# Patient Record
Sex: MALE | Race: WHITE | NOT HISPANIC OR LATINO | Employment: STUDENT | URBAN - METROPOLITAN AREA
[De-identification: names, ages, dates, MRNs, and addresses within clinical notes are randomized per-mention and may not be internally consistent; named-entity substitution may affect disease eponyms.]

---
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
## 2019-05-27 ENCOUNTER — HOSPITAL ENCOUNTER (EMERGENCY)
Facility: CLINIC | Age: 19
Discharge: HOME | End: 2019-05-27
Attending: NURSE PRACTITIONER | Admitting: NURSE PRACTITIONER
Payer: COMMERCIAL

## 2019-05-27 VITALS
WEIGHT: 160 LBS | DIASTOLIC BLOOD PRESSURE: 84 MMHG | RESPIRATION RATE: 14 BRPM | OXYGEN SATURATION: 98 % | SYSTOLIC BLOOD PRESSURE: 126 MMHG | HEART RATE: 65 BPM | BODY MASS INDEX: 24.33 KG/M2 | TEMPERATURE: 97.5 F

## 2019-05-27 DIAGNOSIS — K61.1 PERIRECTAL ABSCESS: Primary | ICD-10-CM

## 2019-05-27 PROCEDURE — 46040 I&D ISCHIORCT&/PERIRCT ABSC: CPT | Mod: Z6 | Performed by: NURSE PRACTITIONER

## 2019-05-27 PROCEDURE — 76857 US EXAM PELVIC LIMITED: CPT | Performed by: NURSE PRACTITIONER

## 2019-05-27 PROCEDURE — 76857 US EXAM PELVIC LIMITED: CPT | Mod: 26 | Performed by: NURSE PRACTITIONER

## 2019-05-27 PROCEDURE — 46040 I&D ISCHIORCT&/PERIRCT ABSC: CPT | Performed by: NURSE PRACTITIONER

## 2019-05-27 PROCEDURE — 99284 EMERGENCY DEPT VISIT MOD MDM: CPT | Mod: 25 | Performed by: NURSE PRACTITIONER

## 2019-05-27 PROCEDURE — 25000125 ZZHC RX 250: Performed by: NURSE PRACTITIONER

## 2019-05-27 RX ORDER — LIDOCAINE HYDROCHLORIDE 20 MG/ML
JELLY TOPICAL ONCE
Status: DISCONTINUED | OUTPATIENT
Start: 2019-05-27 | End: 2019-05-27 | Stop reason: HOSPADM

## 2019-05-27 RX ORDER — OXYCODONE AND ACETAMINOPHEN 5; 325 MG/1; MG/1
1-2 TABLET ORAL EVERY 4 HOURS PRN
Qty: 12 TABLET | Refills: 0 | Status: SHIPPED | OUTPATIENT
Start: 2019-05-27 | End: 2019-06-19

## 2019-05-27 RX ADMIN — Medication: at 10:45

## 2019-05-27 ASSESSMENT — ENCOUNTER SYMPTOMS
VOMITING: 0
EYE PAIN: 0
FEVER: 1
SHORTNESS OF BREATH: 0
HEADACHES: 0
WHEEZING: 0
CONSTIPATION: 0
CONFUSION: 0
SPEECH DIFFICULTY: 0
BLOOD IN STOOL: 1
DYSURIA: 0
RECTAL PAIN: 1
WOUND: 1
SORE THROAT: 0
DIFFICULTY URINATING: 0
SINUS PAIN: 0
DIARRHEA: 0
NAUSEA: 0
ABDOMINAL PAIN: 0
HEMATURIA: 0
PALPITATIONS: 0
COUGH: 0
DIAPHORESIS: 1
ANAL BLEEDING: 1
CHILLS: 1

## 2019-05-27 NOTE — ED NOTES
Pt here with fredo-rectal abscess. He had one last year that required draining twice. Pt states that he has been having the pain on and off since it was last drained. Started to feel nauseated from the pain yesterday.

## 2019-05-27 NOTE — LETTER
May 27, 2019      To Whom It May Concern:      Jose Maria Rai was seen in our Emergency Department today, 05/27/19.  I expect his condition to improve over the next 4 days.  He may return to work on 05/31/2019.    Sincerely,        ERIKA Jalloh CNP

## 2019-05-27 NOTE — ED PROVIDER NOTES
History     Chief Complaint   Patient presents with     Wound Infection     recurrent fredo anal abcess     HPI  Jose Maria Rai is a 19 year old male who presents with concerns of fredo-rectal abscess.   Pt has reported intermittent rectal pain since July of 2018.  Pt states he has daily rectal bleeding with mixture of pustular drainage since July of 2018.  Pt reports yesterday it became swollen and puffy and he felt chills and sweats as well.  PT has not tried any therapies and denies fevers.  Admits to history of perirectal abscess twice last year and subsequent visit with a surgeon who advised him that this should heal without any surgical treatment.        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 Provider to review          Past Medical History:    Past Medical History:   Diagnosis Date     Congenital stenosis of pulmonary valve 1999       Past Surgical History:    Past Surgical History:   Procedure Laterality Date     SURGICAL HISTORY OF -   07/24/00    BMT     SURGICAL HISTORY OF -   06/02/99    Heart Surgery       Family History:    Family History   Problem Relation Age of Onset     Cancer Paternal Grandmother        Social History:  Marital Status:  Single [1]  Social History     Tobacco Use     Smoking status: Never Smoker     Smokeless tobacco: Never Used   Substance Use Topics     Alcohol use: No     Drug use: No        Medications:      amoxicillin-clavulanate (AUGMENTIN) 875-125 MG tablet   oxyCODONE-acetaminophen (PERCOCET) 5-325 MG tablet     Review of Systems   Constitutional:  Positive for chills, diaphoresis and fever (subjective).   HENT: Negative for ear pain, sinus pain and sore throat.    Eyes: Negative for pain and visual disturbance.   Respiratory: Negative for cough, shortness of breath and wheezing.    Cardiovascular: Negative for chest pain, palpitations and leg swelling.   Gastrointestinal: Positive for anal bleeding, blood in stool and rectal pain. Negative for abdominal pain, constipation, diarrhea, nausea and vomiting.   Genitourinary: Negative for difficulty urinating, dysuria and hematuria.   Skin: Positive for wound (fredo rectal). Negative for rash.   Neurological: Negative for speech difficulty and headaches.   Psychiatric/Behavioral: Negative for confusion and self-injury.   All other systems reviewed and are negative.      Physical Exam   BP: 126/84  Pulse: 65  Temp: 97.5  F (36.4  C)  Resp: 14  Weight: 72.6 kg (160 lb)  SpO2: 98 %      Physical Exam   Constitutional: He appears well-developed and well-nourished. No distress.   HENT:   Head: Atraumatic. Head is without contusion.   Right Ear: External ear normal.   Left Ear: External ear normal.   Nose: Nose normal. No rhinorrhea.   Mouth/Throat: Uvula is midline, oropharynx is clear and moist and mucous membranes are normal.   Eyes: Pupils are equal, round, and reactive to light. No scleral icterus.   Neck: Normal range of motion. No tracheal deviation present.   Cardiovascular: Normal rate, regular rhythm and intact distal pulses. Exam reveals no gallop and no friction rub.   Murmur heard.   Systolic murmur is present with a grade of 3/6.  Pulmonary/Chest: Effort normal and breath sounds normal. No stridor. No respiratory distress. He has no wheezes. He has no rales.   Genitourinary: Rectal exam shows mass and tenderness. Rectal exam shows no external hemorrhoid, no internal hemorrhoid and no fissure.         Lymphadenopathy:     He has no cervical adenopathy.   Skin: Skin is warm. Capillary refill takes less than 2  seconds. No rash noted. He is not diaphoretic.   Psychiatric: He has a normal mood and affect.   Nursing note and vitals reviewed.      ED Course        Incision + drainage  Date/Time: 5/27/2019 10:30 AM  Performed by: Summer Burciaga APRN CNP  Authorized by: Summer Burciaga APRN CNP     Consent:     Consent obtained:  Verbal    Consent given by:  Patient    Risks discussed:  Bleeding, incomplete drainage and infection    Alternatives discussed:  No treatment  Location:     Type:  Abscess    Location:  Anogenital    Anogenital location:  Perianal  Pre-procedure details:     Skin preparation:  Betadine  Anesthesia (see MAR for exact dosages):     Anesthesia method:  Local infiltration    Local anesthetic:  Bupivacaine 0.5% WITH epi  Procedure type:     Complexity:  Simple  Procedure details:     Needle aspiration: no      Incision types:  Single straight    Incision depth:  Subcutaneous    Scalpel blade:  11    Wound management:  Extensive cleaning    Drainage:  Bloody and purulent    Drainage amount:  Scant    Wound treatment:  Wound left open    Packing materials:  None  Post-procedure details:     Patient tolerance of procedure:  Tolerated well, no immediate complications        Results for orders placed or performed during the hospital encounter of 05/27/19 (from the past 24 hour(s))   Incision + drainage    Narrative    Summer Burciaga APRN CNP     5/27/2019  3:48 PM  Incision + drainage  Date/Time: 5/27/2019 10:30 AM  Performed by: Summer Burciaga APRN CNP  Authorized by: Summer Burciaga APRN CNP     Consent:     Consent obtained:  Verbal    Consent given by:  Patient    Risks discussed:  Bleeding, incomplete drainage and infection    Alternatives discussed:  No treatment  Location:     Type:  Abscess    Location:  Anogenital    Anogenital location:  Perianal  Pre-procedure details:     Skin preparation:  Betadine  Anesthesia (see MAR for exact dosages):     Anesthesia method:  Local  infiltration    Local anesthetic:  Bupivacaine 0.5% WITH epi  Procedure type:     Complexity:  Simple  Procedure details:     Needle aspiration: no      Incision types:  Single straight    Incision depth:  Subcutaneous    Scalpel blade:  11    Wound management:  Extensive cleaning    Drainage:  Bloody and purulent    Drainage amount:  Scant    Wound treatment:  Wound left open    Packing materials:  None  Post-procedure details:     Patient tolerance of procedure:  Tolerated well, no immediate   complications   POC US SOFT TISSUE    Narrative    Limited Soft Tissue Ultrasound, performed and interpreted by me.    Indication:  Skin redness warmth pain. Evaluate for cellulitis vs abscess.     Body location: buttock- perianal    Findings:  There is cobblestoning suggestive of cellulitis in the evaluated area. There is no fluid collection to suggest abscess. No foreign body identified    IMPRESSION: Cellulitis without remaining abscess             Medications   lidocaine/EPINEPHrine/tetracaine (LET) solution SOLN ( Topical Given 5/27/19 1045)       Assessments & Plan (with Medical Decision Making)  Jose Maria Rai is a 19 year old male who presents with concerns of fredo-rectal abscess.   Pt has reported intermittent rectal pain since July of 2018.  Pt states he has daily rectal bleeding with mixture of pustular drainage since July of 2018.  Pt reports yesterday it became swollen and puffy and he felt chills and sweats as well.  PT has not tried any therapies and denies fevers.   Exam as noted above and concerning for perirectal abscess.  Incision and drainage completed with scant amounts of purulent drainage hemostasis was controlled.  Following incision and drainage consulted with Dr. Sukh Sharma to ensure resolution of incision and drainage and that there is not a deeper abscess that should be addressed.  He completed a point-of-care ultrasound which revealed no additional abscess noted.  Patient to be started on  Augmentin and referral placed for colorectal surgeon.  Pain management with oxycodone was addressed and risks of oxycodone was addressed as well.  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  Discussed sitz baths as beneficial.  Keeping the wound clean and dry was also discussed.  Patient verbalized understanding regarding all of the above and was discharged in stable condition.     I have reviewed the nursing notes.    I have reviewed the findings, diagnosis, plan and need for follow up with the patient.       Medication List      Started    amoxicillin-clavulanate 875-125 MG tablet  Commonly known as:  AUGMENTIN  1 tablet, Oral, 2 TIMES DAILY            Final diagnoses:   Perirectal abscess       5/27/2019   [facility] EMERGENCY DEPARTMENT     Summer Burciaga APRN CNP  05/27/19 1548       Summer Burciaga APRN CNP  05/27/19 1333

## 2019-05-27 NOTE — ED AVS SNAPSHOT
[facility] Emergency Department  [address] [facility] [address]  Phone:  [phone]  Fax:  [phone]                                    Jose Maria Rai   MRN: [id number]    Department:  [facility] Emergency Department   Date of Visit:  5/27/2019           After Visit Summary Signature Page    I have received my discharge instructions, and my questions have been answered. I have discussed any challenges I see with this plan with the nurse or doctor.    ..........................................................................................................................................  Patient/Patient Representative Signature      ..........................................................................................................................................  Patient Representative Print Name and Relationship to Patient    ..................................................               ................................................  Date                                   Time    ..........................................................................................................................................  Reviewed by Signature/Title    ...................................................              ..............................................  Date                                               Time          22EPIC Rev 08/18

## 2019-05-27 NOTE — DISCHARGE INSTRUCTIONS
Use ibuprofen 400-600 mg up to 4 times per day if needed for pain. Stop if it is causing nausea or abdominal pain.   Add acetaminophen 500 mg 1-2 pills up to every 4 hours if needed for pain.   You may add oxycodone 5 mg, 1-2 tablets up to every 6 hours if needed for pain.  Try to use this primarily only at night to help with sleep.    Do not use alcohol, operate machinery, drive, or climb on ladders for 8 hours after taking oxycodone. Use docusate (100mg) 2 times a day to prevent constipation while on narcotics.

## 2019-06-06 ENCOUNTER — TRANSFERRED RECORDS (OUTPATIENT)
Dept: HEALTH INFORMATION MANAGEMENT | Facility: CLINIC | Age: 19
End: 2019-06-06

## 2019-06-19 ENCOUNTER — OFFICE VISIT (OUTPATIENT)
Dept: FAMILY MEDICINE | Facility: CLINIC | Age: 19
End: 2019-06-19
Payer: COMMERCIAL

## 2019-06-19 VITALS
HEART RATE: 70 BPM | WEIGHT: 152 LBS | DIASTOLIC BLOOD PRESSURE: 60 MMHG | SYSTOLIC BLOOD PRESSURE: 100 MMHG | BODY MASS INDEX: 23.04 KG/M2 | TEMPERATURE: 97.8 F | OXYGEN SATURATION: 98 % | HEIGHT: 68 IN

## 2019-06-19 DIAGNOSIS — F41.9 ANXIETY: Primary | ICD-10-CM

## 2019-06-19 PROCEDURE — 99214 OFFICE O/P EST MOD 30 MIN: CPT | Performed by: FAMILY MEDICINE

## 2019-06-19 RX ORDER — PAROXETINE 20 MG/1
20 TABLET, FILM COATED ORAL AT BEDTIME
Qty: 30 TABLET | Refills: 3 | Status: SHIPPED | OUTPATIENT
Start: 2019-06-19 | End: 2019-07-31

## 2019-06-19 ASSESSMENT — ANXIETY QUESTIONNAIRES
3. WORRYING TOO MUCH ABOUT DIFFERENT THINGS: NEARLY EVERY DAY
7. FEELING AFRAID AS IF SOMETHING AWFUL MIGHT HAPPEN: MORE THAN HALF THE DAYS
2. NOT BEING ABLE TO STOP OR CONTROL WORRYING: SEVERAL DAYS
1. FEELING NERVOUS, ANXIOUS, OR ON EDGE: MORE THAN HALF THE DAYS
5. BEING SO RESTLESS THAT IT IS HARD TO SIT STILL: MORE THAN HALF THE DAYS
IF YOU CHECKED OFF ANY PROBLEMS ON THIS QUESTIONNAIRE, HOW DIFFICULT HAVE THESE PROBLEMS MADE IT FOR YOU TO DO YOUR WORK, TAKE CARE OF THINGS AT HOME, OR GET ALONG WITH OTHER PEOPLE: SOMEWHAT DIFFICULT
GAD7 TOTAL SCORE: 13
6. BECOMING EASILY ANNOYED OR IRRITABLE: SEVERAL DAYS

## 2019-06-19 ASSESSMENT — MIFFLIN-ST. JEOR: SCORE: 1673.97

## 2019-06-19 ASSESSMENT — PATIENT HEALTH QUESTIONNAIRE - PHQ9
SUM OF ALL RESPONSES TO PHQ QUESTIONS 1-9: 5
5. POOR APPETITE OR OVEREATING: MORE THAN HALF THE DAYS

## 2019-06-19 NOTE — PATIENT INSTRUCTIONS
I do think you have anxiety.    I recommend to continue with following up with your counselor.    In addition I am starting off with paroxetine medication to help with sleep and also the anxiety.    I recommend to take 20 mg at night.    Follow up in 3 weeks with a phone visit.       Thank you for choosing [facility].  You may be receiving an email and/or telephone survey request from [facility] [employer] regarding your visit today.  Please take a few minutes to respond to the survey to let us know how we are doing.      If you have questions or concerns, please contact us via [employer] or you can contact your care team at [phone].    Our Clinic hours are:  Monday 6:40 am  to 7:00 pm  Tuesday -Friday 6:40 am to 5:00 pm    The [place] outpatient lab hours are:  Monday - Friday 6:10 am to 4:45 pm  Saturdays 7:00 am to 11:00 am  Appointments are required, call [phone]    If you have clinical questions after hours or would like to schedule an appointment,  call the clinic at [phone].

## 2019-06-19 NOTE — PROGRESS NOTES
"Subjective     Jose Maria Rai is a 19 year old male who presents to clinic today for the following health issues:    HPI   Abnormal Mood Symptoms, anxiety mostly    Duration: \"long time\" had a breakdown last month. Has a counselor, talk therapy only.    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  Replays conversations.  He had some depression when you was younger until he entered .  He has found exercise to help his self esteem. He found though with his good body not everything feel in place. He had insomnia.  He reports starting in college self medicate with marijuana. This is now nightly.  HS went well.  He went to college and finds it to be much harder.  No anxiety during the day until he had was taking anatomy physiology 2 this summer, had to withdraw.  He realized he has anxiety. He started to see a counselor.  No other forms of anxiety.  Tried OTC meds for sleep.            Reviewed and updated as needed this visit by Provider  Tobacco  Allergies  Meds  Problems  Med Hx  Surg Hx  Fam Hx         Review of Systems   Review Of Systems  Skin: has a fistula in the perianal region and will be having surgery  Eyes:   Ears/Nose/Throat:   Respiratory: No shortness of breath, dyspnea on exertion, cough, or hemoptysis  Cardiovascular: heart palpations at times  Gastrointestinal:   Genitourinary:   Musculoskeletal:   Neurologic:   Psychiatric: as above  Hematologic/Lymphatic/Immunologic:   Endocrine:         Objective    /60 (Cuff Size: Adult Regular)   " "Pulse 70   Temp 97.8  F (36.6  C) (Tympanic)   Ht 1.727 m (5' 8\")   Wt 68.9 kg (152 lb)   SpO2 98%   BMI 23.11 kg/m    Body mass index is 23.11 kg/m .  Physical Exam   GENERAL APPEARANCE: healthy, alert and no distress  RESP: lungs clear to auscultation - no rales, rhonchi or wheezes  CV: regular rates and rhythm, normal S1 S2, no S3 or S4 and no murmur, click or rub  MS: extremities normal- no gross deformities noted  SKIN: no suspicious lesions or rashes  NEURO: Normal strength and tone, mentation intact and speech normal  PSYCH: mentation appears normal and affect normal/bright            Assessment & Plan     (F41.9) Anxiety  (primary encounter diagnosis)  Comment: I do believe he is having anxiety at night.  Discussed serotonin specific reuptake inhibitor and role.  Discussed forms of anxiety.  Trial of med and follow up.  Handouts given on medication, anxiety, sleep hygiene etc.    Plan: PARoxetine (PAXIL) 20 MG tablet        Continue with seeing his counselor too.       Tobacco Cessation:   reports that he has been smoking cigarettes.  He has never used smokeless tobacco.    Counseling/Coordination of care is over 15 min in 25 min appt.        See Patient Instructions    Return in about 3 weeks (around 7/10/2019).    Luan Law MD  [facility]      "

## 2019-06-20 ASSESSMENT — ANXIETY QUESTIONNAIRES: GAD7 TOTAL SCORE: 13

## 2019-06-24 PROBLEM — F41.9 ANXIETY: Status: ACTIVE | Noted: 2019-06-24

## 2019-07-09 ENCOUNTER — OFFICE VISIT (OUTPATIENT)
Dept: FAMILY MEDICINE | Facility: CLINIC | Age: 19
End: 2019-07-09
Payer: COMMERCIAL

## 2019-07-09 VITALS
BODY MASS INDEX: 23.04 KG/M2 | RESPIRATION RATE: 16 BRPM | SYSTOLIC BLOOD PRESSURE: 122 MMHG | HEIGHT: 68 IN | TEMPERATURE: 97.7 F | OXYGEN SATURATION: 97 % | HEART RATE: 73 BPM | WEIGHT: 152 LBS | DIASTOLIC BLOOD PRESSURE: 60 MMHG

## 2019-07-09 DIAGNOSIS — Z01.818 PREOP GENERAL PHYSICAL EXAM: Primary | ICD-10-CM

## 2019-07-09 DIAGNOSIS — K60.40 RECTAL FISTULA: ICD-10-CM

## 2019-07-09 PROCEDURE — 99214 OFFICE O/P EST MOD 30 MIN: CPT | Performed by: FAMILY MEDICINE

## 2019-07-09 ASSESSMENT — MIFFLIN-ST. JEOR
SCORE: 1673.97
SCORE: 1677.58

## 2019-07-09 NOTE — PATIENT INSTRUCTIONS
Before Your Surgery      Call your surgeon if there is any change in your health. This includes signs of a cold or flu (such as a sore throat, runny nose, cough, rash or fever).    Do not smoke, drink alcohol or take over the counter medicine (unless your surgeon or primary care doctor tells you to) for the 24 hours before and after surgery.    If you take prescribed drugs: Follow your doctor s orders about which medicines to take and which to stop until after surgery.    Eating and drinking prior to surgery: follow the instructions from your surgeon    Take a shower or bath the night before surgery. Use the soap your surgeon gave you to gently clean your skin. If you do not have soap from your surgeon, use your regular soap. Do not shave or scrub the surgery site.  Wear clean pajamas and have clean sheets on your bed.   Patient Education     Presurgery Checklist  You are scheduled to have surgery. The healthcare staff will try to make your stay comfortable. Use the guidelines below to remind yourself what to do before surgery. Be sure to follow any specific pre-op instructions from your surgeon or nurse.  Preparing for surgery  If you are having abdominal surgery, ask what you need to do to clear your bowel.  Tell your surgeon if you have allergies to any medicines, latex, or foods.  Ask your surgeon if you might need a blood transfusion during surgery and if so, how to prepare for it. In some cases, you can donate blood before surgery. If needed, this blood can be given back (transfused) to you during or after surgery.  Arrange for an adult family member or friend to drive you home after surgery. If possible, have someone ready to help you at home as you recover.  Call the surgeon if you get a cold, fever, sore throat, diarrhea, or other health problem just before surgery. Your surgeon can decide whether or not to postpone the surgery.  Medicines  Tell your surgeon about all medicines you take, including  prescription and over-the-counter products such as herbal remedies and vitamins. Ask if you should continue taking them.  If you take ibuprofen, naproxen, or blood thinners (anticoagulants) such as aspirin, clopidogrel, or warfarin, ask your surgeon whether you should stop taking them and how long before surgery you should stop.  You may be told to take antibiotics just before surgery to prevent infection. If so, follow instructions carefully on how to take them.  If you are told to take blood thinners to help prevent blood clots after surgery, be sure to follow the instructions on how to take them.  Stop smoking  If you smoke, healing may take longer. So at least 2 week(s) before surgery, stop smoking.  Bathing or showering before surgery  If instructed, wash with antibacterial soap. Afterward, do not use lotions, oils, or powders.  If you are having surgery on the head, you may be asked to shampoo with antibacterial soap. Follow instructions for doing so.  Do not remove hair from the surgery site  Do not shave hair from the incision site, unless you are given specific instructions to do so. Usually, if hair needs to be removed, it will be done at the hospital right before surgery.  Don t eat or drink  Follow any directions you are given for not eating or drinking before surgery. If you don't follow instructions about when to stop eating and drinking, your procedure may be postponed or rescheduled for another day. This is a safety issue.  You can brush your teeth and rinse your mouth, but don t swallow any water.  Day of surgery  Don't wear makeup. Don't use perfume, deodorant, or hairspray. Remove nail polish and artificial nails.  Leave jewelry (including rings), watches, and other valuables at home.  Be sure to bring health insurance cards or forms and a photo ID.  Bring a list of your medicines (include the name, dose, how often you take them, and the time last dose was taken).  Arrive on time at the hospital  or surgery facility.  Date Last Reviewed: 12/1/2016 2000-2018 [employer]. [address] [place] [address], [place] [address]. All rights reserved. This information is not intended as a substitute for professional medical care. Always follow your healthcare professional's instructions.

## 2019-07-10 ENCOUNTER — VIRTUAL VISIT (OUTPATIENT)
Dept: FAMILY MEDICINE | Facility: CLINIC | Age: 19
End: 2019-07-10
Payer: COMMERCIAL

## 2019-07-10 DIAGNOSIS — Z53.9 ERRONEOUS ENCOUNTER--DISREGARD: Primary | ICD-10-CM

## 2019-07-10 NOTE — PROGRESS NOTES
Unable to reach patient by phone, no answer, mailbox full-unable to leave message.  NICHOLAS Oconnor ([facility])

## 2019-07-11 ENCOUNTER — ANESTHESIA - HEALTHEAST (OUTPATIENT)
Dept: SURGERY | Facility: AMBULATORY SURGERY CENTER | Age: 19
End: 2019-07-11

## 2019-07-12 ENCOUNTER — SURGERY - HEALTHEAST (OUTPATIENT)
Dept: SURGERY | Facility: AMBULATORY SURGERY CENTER | Age: 19
End: 2019-07-12

## 2019-07-31 ENCOUNTER — VIRTUAL VISIT (OUTPATIENT)
Dept: FAMILY MEDICINE | Facility: CLINIC | Age: 19
End: 2019-07-31
Payer: COMMERCIAL

## 2019-07-31 DIAGNOSIS — F41.9 ANXIETY: ICD-10-CM

## 2019-07-31 PROCEDURE — 99441 ZZC PHYSICIAN TELEPHONE EVALUATION 5-10 MIN: CPT | Performed by: FAMILY MEDICINE

## 2019-07-31 RX ORDER — PAROXETINE 20 MG/1
20 TABLET, FILM COATED ORAL AT BEDTIME
Qty: 90 TABLET | Refills: 3 | Status: SHIPPED | OUTPATIENT
Start: 2019-07-31 | End: 2020-08-27

## 2019-07-31 ASSESSMENT — ANXIETY QUESTIONNAIRES
2. NOT BEING ABLE TO STOP OR CONTROL WORRYING: NOT AT ALL
5. BEING SO RESTLESS THAT IT IS HARD TO SIT STILL: NOT AT ALL
GAD7 TOTAL SCORE: 2
7. FEELING AFRAID AS IF SOMETHING AWFUL MIGHT HAPPEN: SEVERAL DAYS
IF YOU CHECKED OFF ANY PROBLEMS ON THIS QUESTIONNAIRE, HOW DIFFICULT HAVE THESE PROBLEMS MADE IT FOR YOU TO DO YOUR WORK, TAKE CARE OF THINGS AT HOME, OR GET ALONG WITH OTHER PEOPLE: NOT DIFFICULT AT ALL
1. FEELING NERVOUS, ANXIOUS, OR ON EDGE: NOT AT ALL
3. WORRYING TOO MUCH ABOUT DIFFERENT THINGS: SEVERAL DAYS
6. BECOMING EASILY ANNOYED OR IRRITABLE: NOT AT ALL

## 2019-07-31 ASSESSMENT — PATIENT HEALTH QUESTIONNAIRE - PHQ9
5. POOR APPETITE OR OVEREATING: NOT AT ALL
SUM OF ALL RESPONSES TO PHQ QUESTIONS 1-9: 2

## 2019-07-31 NOTE — PROGRESS NOTES
Jose Maria Rai is a 19 year old male who is being evaluated via a billable telephone visit.      Consent has been obtained for this service by 1 care team member: yes. See the scanned image in the medical record.    Jose Maria Rai complains of    Chief Complaint   Patient presents with     Anxiety     follow up new start Paxil on 6/19/19       I have reviewed and updated the patient's Past Medical History, Social History, Family History and Medication List.    ALLERGIES  Patient has no known allergies.    NICHOLAS Oconnor ([facility]) ([place] signature)    Anxiety Follow-Up    How are you doing with your anxiety since your last visit? Improved-- has dropped one of his classes. Also had surgery recently    Are you having other symptoms that might be associated with anxiety? No    Have you had a significant life event? OTHER: recent surgery     Are you feeling depressed? No    Do you have any concerns with your use of alcohol or other drugs? No    Social History     Tobacco Use     Smoking status: Never Smoker     Smokeless tobacco: Never Used   Substance Use Topics     Alcohol use: Yes     Comment: 12 beers per weekend     Drug use: Yes     Types: Marijuana     KATHRYN-7 SCORE 6/19/2019 7/31/2019   Total Score 13 2     PHQ 6/19/2019 7/31/2019   PHQ-9 Total Score 5 2   Q9: Thoughts of better off dead/self-harm past 2 weeks Not at all Not at all           Amount of exercise or physical activity: 6-7 days/week for an average of greater than 60 minutes    Problems taking medications regularly: missed a few days    Medication side effects: none    Diet: regular (no restrictions)     Assessment/Plan:  (F41.9) Anxiety  Comment: Jose Maria is doing better.  No side effect of his medication. He is feeling better.  He reports also lifestyle changes with dropping an online class.  He is going back to college in 4 weeks.  Feeling much better.  Sleep is better.   Plan: PARoxetine (PAXIL) 20 MG tablet        Discussed continue with  medication, recheck in 8 weeks after returning to school.      I have reviewed the note as documented above.  This accurately captures the substance of my conversation with the patient.      Total time of call between patient and provider was 7 minutes     Luan Law MD

## 2019-08-01 ASSESSMENT — ANXIETY QUESTIONNAIRES: GAD7 TOTAL SCORE: 2

## 2019-12-28 ENCOUNTER — APPOINTMENT (OUTPATIENT)
Dept: CT IMAGING | Facility: CLINIC | Age: 19
End: 2019-12-28
Attending: EMERGENCY MEDICINE
Payer: COMMERCIAL

## 2019-12-28 ENCOUNTER — HOSPITAL ENCOUNTER (EMERGENCY)
Facility: CLINIC | Age: 19
Discharge: HOME | End: 2019-12-29
Attending: EMERGENCY MEDICINE | Admitting: EMERGENCY MEDICINE
Payer: COMMERCIAL

## 2019-12-28 DIAGNOSIS — R19.7 NAUSEA, VOMITING, AND DIARRHEA: ICD-10-CM

## 2019-12-28 DIAGNOSIS — R11.2 NAUSEA, VOMITING, AND DIARRHEA: ICD-10-CM

## 2019-12-28 DIAGNOSIS — R10.84 ABDOMINAL PAIN, GENERALIZED: ICD-10-CM

## 2019-12-28 LAB
ALBUMIN SERPL-MCNC: 4.8 G/DL (ref 3.4–5)
ALP SERPL-CCNC: 57 U/L (ref 40–150)
ALT SERPL W P-5'-P-CCNC: 44 U/L (ref 0–70)
ANION GAP SERPL CALCULATED.3IONS-SCNC: 11 MMOL/L (ref 3–14)
AST SERPL W P-5'-P-CCNC: 40 U/L (ref 0–45)
BASOPHILS # BLD AUTO: 0.1 10E9/L (ref 0–0.2)
BASOPHILS NFR BLD AUTO: 0.5 %
BILIRUB SERPL-MCNC: 1.2 MG/DL (ref 0.2–1.3)
BUN SERPL-MCNC: 28 MG/DL (ref 7–30)
CALCIUM SERPL-MCNC: 9.5 MG/DL (ref 8.5–10.1)
CHLORIDE SERPL-SCNC: 105 MMOL/L (ref 94–109)
CO2 SERPL-SCNC: 24 MMOL/L (ref 20–32)
CREAT SERPL-MCNC: 1.12 MG/DL (ref 0.66–1.25)
DIFFERENTIAL METHOD BLD: ABNORMAL
EOSINOPHIL # BLD AUTO: 0.1 10E9/L (ref 0–0.7)
EOSINOPHIL NFR BLD AUTO: 0.8 %
ERYTHROCYTE [DISTWIDTH] IN BLOOD BY AUTOMATED COUNT: 12.3 % (ref 10–15)
GFR SERPL CREATININE-BSD FRML MDRD: >90 ML/MIN/{1.73_M2}
GLUCOSE SERPL-MCNC: 107 MG/DL (ref 70–99)
HCT VFR BLD AUTO: 47.3 % (ref 40–53)
HGB BLD-MCNC: 16.2 G/DL (ref 13.3–17.7)
IMM GRANULOCYTES # BLD: 0 10E9/L (ref 0–0.4)
IMM GRANULOCYTES NFR BLD: 0.3 %
LIPASE SERPL-CCNC: 198 U/L (ref 73–393)
LYMPHOCYTES # BLD AUTO: 0.9 10E9/L (ref 0.8–5.3)
LYMPHOCYTES NFR BLD AUTO: 6.6 %
MCH RBC QN AUTO: 31.6 PG (ref 26.5–33)
MCHC RBC AUTO-ENTMCNC: 34.2 G/DL (ref 31.5–36.5)
MCV RBC AUTO: 92 FL (ref 78–100)
MONOCYTES # BLD AUTO: 0.6 10E9/L (ref 0–1.3)
MONOCYTES NFR BLD AUTO: 4.9 %
NEUTROPHILS # BLD AUTO: 11.2 10E9/L (ref 1.6–8.3)
NEUTROPHILS NFR BLD AUTO: 86.9 %
NRBC # BLD AUTO: 0 10*3/UL
NRBC BLD AUTO-RTO: 0 /100
PLATELET # BLD AUTO: 168 10E9/L (ref 150–450)
POTASSIUM SERPL-SCNC: 3.4 MMOL/L (ref 3.4–5.3)
PROT SERPL-MCNC: 8.2 G/DL (ref 6.8–8.8)
RBC # BLD AUTO: 5.13 10E12/L (ref 4.4–5.9)
SODIUM SERPL-SCNC: 140 MMOL/L (ref 133–144)
WBC # BLD AUTO: 12.9 10E9/L (ref 4–11)

## 2019-12-28 PROCEDURE — 25800030 ZZH RX IP 258 OP 636: Performed by: EMERGENCY MEDICINE

## 2019-12-28 PROCEDURE — 76705 ECHO EXAM OF ABDOMEN: CPT | Mod: 26 | Performed by: STUDENT IN AN ORGANIZED HEALTH CARE EDUCATION/TRAINING PROGRAM

## 2019-12-28 PROCEDURE — 83690 ASSAY OF LIPASE: CPT | Performed by: EMERGENCY MEDICINE

## 2019-12-28 PROCEDURE — 76705 ECHO EXAM OF ABDOMEN: CPT | Performed by: STUDENT IN AN ORGANIZED HEALTH CARE EDUCATION/TRAINING PROGRAM

## 2019-12-28 PROCEDURE — 76604 US EXAM CHEST: CPT | Performed by: STUDENT IN AN ORGANIZED HEALTH CARE EDUCATION/TRAINING PROGRAM

## 2019-12-28 PROCEDURE — 99285 EMERGENCY DEPT VISIT HI MDM: CPT | Mod: 25 | Performed by: STUDENT IN AN ORGANIZED HEALTH CARE EDUCATION/TRAINING PROGRAM

## 2019-12-28 PROCEDURE — 25000128 H RX IP 250 OP 636

## 2019-12-28 PROCEDURE — 70450 CT HEAD/BRAIN W/O DYE: CPT

## 2019-12-28 PROCEDURE — 80053 COMPREHEN METABOLIC PANEL: CPT | Performed by: EMERGENCY MEDICINE

## 2019-12-28 PROCEDURE — 74177 CT ABD & PELVIS W/CONTRAST: CPT

## 2019-12-28 PROCEDURE — 93308 TTE F-UP OR LMTD: CPT | Performed by: STUDENT IN AN ORGANIZED HEALTH CARE EDUCATION/TRAINING PROGRAM

## 2019-12-28 PROCEDURE — 25000125 ZZHC RX 250: Performed by: EMERGENCY MEDICINE

## 2019-12-28 PROCEDURE — 25000128 H RX IP 250 OP 636: Performed by: EMERGENCY MEDICINE

## 2019-12-28 PROCEDURE — 87804 INFLUENZA ASSAY W/OPTIC: CPT | Performed by: EMERGENCY MEDICINE

## 2019-12-28 PROCEDURE — 96375 TX/PRO/DX INJ NEW DRUG ADDON: CPT | Mod: 59 | Performed by: STUDENT IN AN ORGANIZED HEALTH CARE EDUCATION/TRAINING PROGRAM

## 2019-12-28 PROCEDURE — 96374 THER/PROPH/DIAG INJ IV PUSH: CPT | Mod: 59 | Performed by: STUDENT IN AN ORGANIZED HEALTH CARE EDUCATION/TRAINING PROGRAM

## 2019-12-28 PROCEDURE — 96361 HYDRATE IV INFUSION ADD-ON: CPT | Performed by: STUDENT IN AN ORGANIZED HEALTH CARE EDUCATION/TRAINING PROGRAM

## 2019-12-28 PROCEDURE — 93308 TTE F-UP OR LMTD: CPT | Mod: 26 | Performed by: STUDENT IN AN ORGANIZED HEALTH CARE EDUCATION/TRAINING PROGRAM

## 2019-12-28 PROCEDURE — 85025 COMPLETE CBC W/AUTO DIFF WBC: CPT | Performed by: EMERGENCY MEDICINE

## 2019-12-28 PROCEDURE — 76604 US EXAM CHEST: CPT | Mod: 26 | Performed by: STUDENT IN AN ORGANIZED HEALTH CARE EDUCATION/TRAINING PROGRAM

## 2019-12-28 RX ORDER — ONDANSETRON 2 MG/ML
4 INJECTION INTRAMUSCULAR; INTRAVENOUS
Status: DISCONTINUED | OUTPATIENT
Start: 2019-12-28 | End: 2019-12-29 | Stop reason: HOSPADM

## 2019-12-28 RX ORDER — LORAZEPAM 2 MG/ML
INJECTION INTRAMUSCULAR
Status: COMPLETED
Start: 2019-12-28 | End: 2019-12-28

## 2019-12-28 RX ORDER — IOPAMIDOL 755 MG/ML
78 INJECTION, SOLUTION INTRAVASCULAR ONCE
Status: COMPLETED | OUTPATIENT
Start: 2019-12-28 | End: 2019-12-28

## 2019-12-28 RX ORDER — LORAZEPAM 2 MG/ML
1 INJECTION INTRAMUSCULAR ONCE
Status: COMPLETED | OUTPATIENT
Start: 2019-12-28 | End: 2019-12-28

## 2019-12-28 RX ADMIN — LORAZEPAM 1 MG: 2 INJECTION INTRAMUSCULAR at 23:23

## 2019-12-28 RX ADMIN — LORAZEPAM 1 MG: 2 INJECTION INTRAMUSCULAR; INTRAVENOUS at 23:23

## 2019-12-28 RX ADMIN — SODIUM CHLORIDE 59 ML: 9 INJECTION, SOLUTION INTRAVENOUS at 23:56

## 2019-12-28 RX ADMIN — SODIUM CHLORIDE 1000 ML: 9 INJECTION, SOLUTION INTRAVENOUS at 23:42

## 2019-12-28 RX ADMIN — ONDANSETRON 4 MG: 2 INJECTION INTRAMUSCULAR; INTRAVENOUS at 23:41

## 2019-12-28 RX ADMIN — IOPAMIDOL 78 ML: 755 INJECTION, SOLUTION INTRAVENOUS at 23:56

## 2019-12-28 ASSESSMENT — MIFFLIN-ST. JEOR: SCORE: 1710.26

## 2019-12-28 NOTE — ED AVS SNAPSHOT
[facility] Emergency Department  [address] [facility] [address]  Phone:  [phone]  Fax:  [phone]                                    Jose Maria Rai   MRN: [id number]    Department:  [facility] Emergency Department   Date of Visit:  12/28/2019           After Visit Summary Signature Page    I have received my discharge instructions, and my questions have been answered. I have discussed any challenges I see with this plan with the nurse or doctor.    ..........................................................................................................................................  Patient/Patient Representative Signature      ..........................................................................................................................................  Patient Representative Print Name and Relationship to Patient    ..................................................               ................................................  Date                                   Time    ..........................................................................................................................................  Reviewed by Signature/Title    ...................................................              ..............................................  Date                                               Time          22EPIC Rev 08/18

## 2019-12-29 VITALS
WEIGHT: 160 LBS | DIASTOLIC BLOOD PRESSURE: 80 MMHG | TEMPERATURE: 99.1 F | OXYGEN SATURATION: 95 % | HEART RATE: 100 BPM | HEIGHT: 68 IN | RESPIRATION RATE: 18 BRPM | SYSTOLIC BLOOD PRESSURE: 143 MMHG | BODY MASS INDEX: 24.25 KG/M2

## 2019-12-29 LAB
ALBUMIN UR-MCNC: NEGATIVE MG/DL
AMPHETAMINES UR QL SCN: NEGATIVE
APPEARANCE UR: CLEAR
BARBITURATES UR QL: NEGATIVE
BENZODIAZ UR QL: NEGATIVE
BILIRUB UR QL STRIP: NEGATIVE
CANNABINOIDS UR QL SCN: POSITIVE
COCAINE UR QL: NEGATIVE
COLOR UR AUTO: YELLOW
FLUAV+FLUBV AG SPEC QL: NEGATIVE
FLUAV+FLUBV AG SPEC QL: NEGATIVE
GLUCOSE UR STRIP-MCNC: NEGATIVE MG/DL
HGB UR QL STRIP: NEGATIVE
KETONES UR STRIP-MCNC: 20 MG/DL
LEUKOCYTE ESTERASE UR QL STRIP: NEGATIVE
NITRATE UR QL: NEGATIVE
OPIATES UR QL SCN: NEGATIVE
PCP UR QL SCN: NEGATIVE
PH UR STRIP: 7 PH (ref 5–7)
SOURCE: ABNORMAL
SP GR UR STRIP: 1.02 (ref 1–1.03)
SPECIMEN SOURCE: NORMAL
UROBILINOGEN UR STRIP-MCNC: 0 MG/DL (ref 0–2)

## 2019-12-29 PROCEDURE — 81003 URINALYSIS AUTO W/O SCOPE: CPT | Performed by: EMERGENCY MEDICINE

## 2019-12-29 PROCEDURE — 80307 DRUG TEST PRSMV CHEM ANLYZR: CPT | Performed by: EMERGENCY MEDICINE

## 2019-12-29 PROCEDURE — 25000128 H RX IP 250 OP 636: Performed by: EMERGENCY MEDICINE

## 2019-12-29 RX ORDER — HALOPERIDOL 5 MG/ML
5 INJECTION INTRAMUSCULAR ONCE
Status: COMPLETED | OUTPATIENT
Start: 2019-12-29 | End: 2019-12-29

## 2019-12-29 RX ORDER — ONDANSETRON 4 MG/1
4-8 TABLET, ORALLY DISINTEGRATING ORAL EVERY 8 HOURS PRN
Qty: 10 TABLET | Refills: 0 | Status: SHIPPED | OUTPATIENT
Start: 2019-12-29 | End: 2020-01-01

## 2019-12-29 RX ADMIN — HALOPERIDOL LACTATE 5 MG: 5 INJECTION, SOLUTION INTRAMUSCULAR at 01:04

## 2019-12-29 NOTE — ED NOTES
Pt arrives at this time in severe  Pain. He states that he fell at 1000 and was Ok until 2000 and he started to get severe abd pain. He states that it is diffuse. He states that he has been vomiting. He is extremely anxious . He is writhing on the bed at times. He is unfocused. He complains of a dry mouth and thirst. He is hyperventilating with good O2 sats on room air.

## 2019-12-29 NOTE — ED PROVIDER NOTES
History     Chief Complaint   Patient presents with     Abdominal Pain     Fell at 1000/ States that he is having severe abd pain at 2000.      HPI  Jose Maria Rai is a 19 year old male who presents with friends complaining of abdominal pain that began this evening at about 8:00 associated with profuse nonbloody nonbilious vomiting.  Describes pain is severe diffuse sharp unrelenting worse with movement.  Denies associated fever although has been chilled and tried to take a shower to warm up this evening.  Denies diarrhea.  Complains of severe headache, denies sore throat or cough or congestion.  Denies ill contacts.  Has history of anxiety and reports compliance with SSRI.  Reportedly had a slip and fall on some stairs this morning at about 10:00 falling backward landing on his back, denies striking his head or loss of consciousness.  Describes feeling sore all over throughout his upper back.  Denies numbness or weakness in his legs or arms.  Denies alcohol intake.  Reports marijuana use 2 to 3 hours ago, his friends are here and they used the same marijuana without symptoms.  Also went out to eat this evening.  Denies history of cyclic vomiting.  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 Provider to review          Past Medical History:    Past Medical History:   Diagnosis Date     Congenital stenosis of pulmonary valve 1999       Past Surgical History:    Past Surgical History:   Procedure Laterality  "Date     SURGICAL HISTORY OF -   07/24/00    BMT     SURGICAL HISTORY OF -   06/02/99    Heart Surgery       Family History:    Family History   Problem Relation Age of Onset     Cancer Paternal Grandmother        Social History:  Marital Status:  Single [1]  Social History     Tobacco Use     Smoking status: Never Smoker     Smokeless tobacco: Never Used   Substance Use Topics     Alcohol use: Yes     Comment: 12 beers per weekend     Drug use: Yes     Types: Marijuana        Medications:    ondansetron (ZOFRAN ODT) 4 MG ODT tab  PARoxetine (PAXIL) 20 MG tablet          Review of Systems  All other systems reviewed and are negative.    Physical Exam   BP: 134/85  Pulse: 96  Heart Rate: 92  Temp: 98.7  F (37.1  C)  Resp: 28  Height: 172.7 cm (5' 8\")  Weight: 72.6 kg (160 lb)  SpO2: 100 %      Physical Exam  Nontoxic-appearing no respiratory distress alert and oriented x3. GCS 15 on arrival, throughout stay and at discharge.  Agitated and anxious.    Head atraumatic normocephalic    Cranial nerves; vision baseline fields intact, PERRL, EOMI, facial sensation intact to light touch, facial muscle tone intact and symmetrical, hearing grossly intact,swallowing without difficulty, voice baseline and normal, SCM  strength intact, tongue protrudes midline.  Palatal elevation symmetric    TM's unremarkable, EACs clear, oropharynx moist without lesions or erythema.    Neck supple full active painless range of motion no posterior midline tenderness.    No cervical adenopathy    Spine nontender to palpation    Pelvis stable nontender    Tenderness palpation of the right rib cage no outward sign of trauma to the chest back or abdomen    Lungs clear to auscultation no rales rhonchi or wheezes    Heart regular no murmur S3 or rub    Abdomen tense diffusely tender voluntary guarding bowel sounds positive no masses or HSM    Strength and sensation intact throughout the extremities, skin clear from rash or lesion.      ED Course      "   Procedures        Critical Care time:  none               Results for orders placed or performed during the hospital encounter of 12/28/19 (from the past 24 hour(s))   POC US ABDOMEN LIMITED    Impression    [facility] Procedure Note      FAST (Focused Assessment with Sonography for Trauma):    PROCEDURE: PERFORMED BY: Dr. Sukh Elizalde MD, MD  INDICATIONS/SYMPTOM:  Chest Wall Pain, Abdominal Pain and Pelvic pain  PROBE: Low frequency convex probe  BODY LOCATION: The ultrasound was performed in the abdominal, subxiphoid and chest areas.  FINDINGS: No evidence of free fluid in hepatorenal (Morison's pouch), perisplenic, or and pelvic areas. No evidence of pericardial effusion.   Extended FAST exam (eFAST):   Images of both lung hemithoracies taken in 2D in multiple rib spaces        Right side:  Lung sliding artifact  Present     Comet tail artifacts  Absent   Left side:  Lung sliding artifact  Present     Comet tail artifacts  Absent   Hemothorax: Right side Absent     Left side Absent  INTERPRETATION: The FAST exam was normal. There was no free fluid present. There was no pericardial effusion.  IMAGE DOCUMENTATION: Images were archived to PACs system.     CBC with platelets differential   Result Value Ref Range    WBC 12.9 (H) 4.0 - 11.0 10e9/L    RBC Count 5.13 4.4 - 5.9 10e12/L    Hemoglobin 16.2 13.3 - 17.7 g/dL    Hematocrit 47.3 40.0 - 53.0 %    MCV 92 78 - 100 fl    MCH 31.6 26.5 - 33.0 pg    MCHC 34.2 31.5 - 36.5 g/dL    RDW 12.3 10.0 - 15.0 %    Platelet Count 168 150 - 450 10e9/L    Diff Method Automated Method     % Neutrophils 86.9 %    % Lymphocytes 6.6 %    % Monocytes 4.9 %    % Eosinophils 0.8 %    % Basophils 0.5 %    % Immature Granulocytes 0.3 %    Nucleated RBCs 0 0 /100    Absolute Neutrophil 11.2 (H) 1.6 - 8.3 10e9/L    Absolute Lymphocytes 0.9 0.8 - 5.3 10e9/L    Absolute Monocytes 0.6 0.0 - 1.3 10e9/L    Absolute Eosinophils 0.1 0.0 - 0.7 10e9/L    Absolute Basophils 0.1 0.0 - 0.2  10e9/L    Abs Immature Granulocytes 0.0 0 - 0.4 10e9/L    Absolute Nucleated RBC 0.0    Comprehensive metabolic panel   Result Value Ref Range    Sodium 140 133 - 144 mmol/L    Potassium 3.4 3.4 - 5.3 mmol/L    Chloride 105 94 - 109 mmol/L    Carbon Dioxide 24 20 - 32 mmol/L    Anion Gap 11 3 - 14 mmol/L    Glucose 107 (H) 70 - 99 mg/dL    Urea Nitrogen 28 7 - 30 mg/dL    Creatinine 1.12 0.66 - 1.25 mg/dL    GFR Estimate >90 >60 mL/min/[1.73_m2]    GFR Estimate If Black >90 >60 mL/min/[1.73_m2]    Calcium 9.5 8.5 - 10.1 mg/dL    Bilirubin Total 1.2 0.2 - 1.3 mg/dL    Albumin 4.8 3.4 - 5.0 g/dL    Protein Total 8.2 6.8 - 8.8 g/dL    Alkaline Phosphatase 57 40 - 150 U/L    ALT 44 0 - 70 U/L    AST 40 0 - 45 U/L   Lipase   Result Value Ref Range    Lipase 198 73 - 393 U/L   Influenza A/B antigen   Result Value Ref Range    Influenza A/B Agn Specimen Nasopharyngeal     Influenza A Negative NEG^Negative    Influenza B Negative NEG^Negative   Head CT w/o contrast    Narrative    EXAM: CT HEAD W/O CONTRAST  LOCATION: [facility]  DATE/TIME: 12/28/2019 11:55 PM    INDICATION: Fall, severe headache  COMPARISON: None.  TECHNIQUE: Routine without IV contrast. Multiplanar reformats. Dose reduction techniques were used.    FINDINGS:  INTRACRANIAL CONTENTS: No intracranial hemorrhage, extraaxial collection, or mass effect.  No CT evidence of acute infarct. Normal parenchymal attenuation. Normal ventricles and sulci.     VISUALIZED ORBITS/SINUSES/MASTOIDS: No intraorbital abnormality. No paranasal sinus mucosal disease. No middle ear or mastoid effusion.    BONES/SOFT TISSUES: No acute abnormality.      Impression    IMPRESSION:  1.  Normal head CT.   CT Chest/Abdomen/Pelvis w Contrast    Narrative    CT CHEST/ABDOMEN/PELVIS W CONTRAST     12/28/2019 11:55 PM      HISTORY:  Fall, right rib pain, abdominal pain, tenderness and vomiting.    TECHNIQUE:  CT chest, abdomen and pelvis with intravenous contrast. Radiation  dose for this scan was reduced using automated exposure control, adjustment of the mA and/or kV according to patient size, or iterative reconstruction technique.  78 mL   Isovue-370.      COMPARISON:  None.    FINDINGS:  Chest:  The lungs are clear. There is an azygos fissure. No pneumothorax or pleural effusion. There is apparent calcification at the posterior and inferior aspect of the heart of uncertain etiology. The heart size is normal. There is no mediastinal,   hilar or axillary lymph node enlargement.    ABDOMEN: There is heterogeneous enhancement of the liver without focal mass or traumatic abnormality. There may be a small amount of pericholecystic fluid. The gallbladder otherwise appears normal. No calcified gallstones. The pancreas, spleen, adrenal   glands and kidneys are normal in appearance. There is no abdominal or pelvic lymph node enlargement.    PELVIS: Bowel appears normal. No free intraperitoneal gas or fluid.    MUSCULOSKELETAL: No acute bone fracture.      Impression    IMPRESSION:   1. No acute traumatic abnormality.  2. Heterogeneous enhancement of the liver without focal traumatic abnormality.   UA reflex to Microscopic   Result Value Ref Range    Color Urine Yellow     Appearance Urine Clear     Glucose Urine Negative NEG^Negative mg/dL    Bilirubin Urine Negative NEG^Negative    Ketones Urine 20 (A) NEG^Negative mg/dL    Specific Gravity Urine 1.025 1.003 - 1.035    Blood Urine Negative NEG^Negative    pH Urine 7.0 5.0 - 7.0 pH    Protein Albumin Urine Negative NEG^Negative mg/dL    Urobilinogen mg/dL 0.0 0.0 - 2.0 mg/dL    Nitrite Urine Negative NEG^Negative    Leukocyte Esterase Urine Negative NEG^Negative    Source Midstream Urine    Drug abuse screen urine   Result Value Ref Range    Amphetamine Qual Urine Negative NEG^Negative    Barbiturates Qual Urine Negative NEG^Negative    Benzodiazepine Qual Urine Negative NEG^Negative    Cannabinoids Qual Urine Positive (A) NEG^Negative     Cocaine Qual Urine Negative NEG^Negative    Opiates Qualitative Urine Negative NEG^Negative    PCP Qual Urine Negative NEG^Negative       Medications   LORazepam (ATIVAN) injection 1 mg (1 mg Intravenous Given 12/28/19 2606)   0.9% sodium chloride BOLUS (0 mLs Intravenous Stopped 12/29/19 0220)   iopamidol (ISOVUE-370) solution 78 mL (78 mLs Intravenous Given 12/28/19 2356)   sodium chloride 0.9 % bag 500mL for CT scan flush use (59 mLs Intravenous Given 12/28/19 2356)   haloperidol lactate (HALDOL) injection 5 mg (5 mg Intravenous Given 12/29/19 0104)       Assessments & Plan (with Medical Decision Making)  19-year-old male presents with agitation, cyclic vomiting, abdominal pain and tenderness, fall down stairs earlier today.  FAST exam is unremarkable.  Sent for CT head chest abdomen pelvis given fall, complain of headache, vomiting, abdominal pain and tenderness.  All the above are unremarkable.  Using cannabis this evening prior to onset of symptoms, also ate at a restaurant.  Treated with fluid bolus, ondansetron, lorazepam and Haldol.  Signed out to Dr. Cheng at change of shift awaiting effects of Haldol and clinical reevaluation.     I have reviewed the nursing notes.    I have reviewed the findings, diagnosis, plan and need for follow up with the patient.              Discharge Medication List as of 12/29/2019  2:21 AM      START taking these medications    Details   ondansetron (ZOFRAN ODT) 4 MG ODT tab Take 1-2 tablets (4-8 mg) by mouth every 8 hours as needed for nausea or vomiting, Disp-10 tablet, R-0, Local Print             Final diagnoses:   Nausea, vomiting, and diarrhea   Abdominal pain, generalized       12/28/2019   [facility] EMERGENCY DEPARTMENT     Sukh Elizalde MD  12/29/19 1280

## 2019-12-29 NOTE — ED NOTES
Pt states that he wants to go home. He is still anxious but states he is better off at home with his mother. He walked to the bathroom to defecate.

## 2019-12-29 NOTE — ED PROVIDER NOTES
"     Emergency Department Patient Sign-out     Brief HPI:  This is a 19 year old male signed out to me by Dr. Elizalde.  Please see initial provider note for details of the presentation.  In brief, the patient presented with generalized abdominal pain and vomiting beginning abruptly at 8 PM after eating out at a local restaurant.  He also reported headache, body aches, and fall earlier in the day but no signs of trauma.  Patient's lab work and imaging results had returned prior to transition of care, plan at transition was for reassessment and disposition home if symptoms resolved.          Exam:   Patient Vitals for the past 24 hrs:   BP Temp Temp src Pulse Heart Rate Resp SpO2 Height Weight   12/29/19 0220 (!) 143/80 -- -- -- -- 18 95 % -- --   12/29/19 0130 115/81 99.1  F (37.3  C) -- 100 -- -- -- -- --   12/29/19 0100 131/85 -- -- 117 -- -- -- -- --   12/29/19 0014 114/64 -- -- 96 -- 20 100 % -- --   12/29/19 0010 -- 98.7  F (37.1  C) Axillary -- -- -- -- -- --   12/28/19 2324 134/85 -- -- -- 92 28 100 % 1.727 m (5' 8\") 72.6 kg (160 lb)         ED RESULTS:   Results for orders placed or performed during the hospital encounter of 12/28/19 (from the past 24 hour(s))   POC US ABDOMEN LIMITED     Status: None (In process)    Collection Time: 12/28/19 11:18 PM    [facility] Procedure Note      FAST (Focused Assessment with Sonography for Trauma):    PROCEDURE: PERFORMED BY: Dr. Sukh Elizalde MD, MD  INDICATIONS/SYMPTOM:  Chest Wall Pain, Abdominal Pain and Pelvic pain  PROBE: Low frequency convex probe  BODY LOCATION: The ultrasound was performed in the abdominal, subxiphoid and chest areas.  FINDINGS: No evidence of free fluid in hepatorenal (Morison's pouch), perisplenic, or and pelvic areas. No evidence of pericardial effusion.   Extended FAST exam (eFAST):   Images of both lung hemithoracies taken in 2D in multiple rib spaces        Right side:  Lung sliding artifact  Present     Comet tail " artifacts  Absent   Left side:  Lung sliding artifact  Present     Comet tail artifacts  Absent   Hemothorax: Right side Absent     Left side Absent  INTERPRETATION: The FAST exam was normal. There was no free fluid present. There was no pericardial effusion.  IMAGE DOCUMENTATION: Images were archived to PACs system.     CBC with platelets differential     Status: Abnormal    Collection Time: 12/28/19 11:26 PM   Result Value Ref Range    WBC 12.9 (H) 4.0 - 11.0 10e9/L    RBC Count 5.13 4.4 - 5.9 10e12/L    Hemoglobin 16.2 13.3 - 17.7 g/dL    Hematocrit 47.3 40.0 - 53.0 %    MCV 92 78 - 100 fl    MCH 31.6 26.5 - 33.0 pg    MCHC 34.2 31.5 - 36.5 g/dL    RDW 12.3 10.0 - 15.0 %    Platelet Count 168 150 - 450 10e9/L    Diff Method Automated Method     % Neutrophils 86.9 %    % Lymphocytes 6.6 %    % Monocytes 4.9 %    % Eosinophils 0.8 %    % Basophils 0.5 %    % Immature Granulocytes 0.3 %    Nucleated RBCs 0 0 /100    Absolute Neutrophil 11.2 (H) 1.6 - 8.3 10e9/L    Absolute Lymphocytes 0.9 0.8 - 5.3 10e9/L    Absolute Monocytes 0.6 0.0 - 1.3 10e9/L    Absolute Eosinophils 0.1 0.0 - 0.7 10e9/L    Absolute Basophils 0.1 0.0 - 0.2 10e9/L    Abs Immature Granulocytes 0.0 0 - 0.4 10e9/L    Absolute Nucleated RBC 0.0    Comprehensive metabolic panel     Status: Abnormal    Collection Time: 12/28/19 11:26 PM   Result Value Ref Range    Sodium 140 133 - 144 mmol/L    Potassium 3.4 3.4 - 5.3 mmol/L    Chloride 105 94 - 109 mmol/L    Carbon Dioxide 24 20 - 32 mmol/L    Anion Gap 11 3 - 14 mmol/L    Glucose 107 (H) 70 - 99 mg/dL    Urea Nitrogen 28 7 - 30 mg/dL    Creatinine 1.12 0.66 - 1.25 mg/dL    GFR Estimate >90 >60 mL/min/[1.73_m2]    GFR Estimate If Black >90 >60 mL/min/[1.73_m2]    Calcium 9.5 8.5 - 10.1 mg/dL    Bilirubin Total 1.2 0.2 - 1.3 mg/dL    Albumin 4.8 3.4 - 5.0 g/dL    Protein Total 8.2 6.8 - 8.8 g/dL    Alkaline Phosphatase 57 40 - 150 U/L    ALT 44 0 - 70 U/L    AST 40 0 - 45 U/L   Lipase     Status: None     Collection Time: 12/28/19 11:26 PM   Result Value Ref Range    Lipase 198 73 - 393 U/L   Influenza A/B antigen     Status: None    Collection Time: 12/28/19 11:52 PM   Result Value Ref Range    Influenza A/B Agn Specimen Nasopharyngeal     Influenza A Negative NEG^Negative    Influenza B Negative NEG^Negative   Head CT w/o contrast     Status: None    Collection Time: 12/29/19 12:09 AM    Narrative    EXAM: CT HEAD W/O CONTRAST  LOCATION: [facility]  DATE/TIME: 12/28/2019 11:55 PM    INDICATION: Fall, severe headache  COMPARISON: None.  TECHNIQUE: Routine without IV contrast. Multiplanar reformats. Dose reduction techniques were used.    FINDINGS:  INTRACRANIAL CONTENTS: No intracranial hemorrhage, extraaxial collection, or mass effect.  No CT evidence of acute infarct. Normal parenchymal attenuation. Normal ventricles and sulci.     VISUALIZED ORBITS/SINUSES/MASTOIDS: No intraorbital abnormality. No paranasal sinus mucosal disease. No middle ear or mastoid effusion.    BONES/SOFT TISSUES: No acute abnormality.      Impression    IMPRESSION:  1.  Normal head CT.   CT Chest/Abdomen/Pelvis w Contrast     Status: None    Collection Time: 12/29/19 12:10 AM    Narrative    CT CHEST/ABDOMEN/PELVIS W CONTRAST     12/28/2019 11:55 PM      HISTORY:  Fall, right rib pain, abdominal pain, tenderness and vomiting.    TECHNIQUE:  CT chest, abdomen and pelvis with intravenous contrast. Radiation dose for this scan was reduced using automated exposure control, adjustment of the mA and/or kV according to patient size, or iterative reconstruction technique.  78 mL   Isovue-370.      COMPARISON:  None.    FINDINGS:  Chest:  The lungs are clear. There is an azygos fissure. No pneumothorax or pleural effusion. There is apparent calcification at the posterior and inferior aspect of the heart of uncertain etiology. The heart size is normal. There is no mediastinal,   hilar or axillary lymph node enlargement.    ABDOMEN:  There is heterogeneous enhancement of the liver without focal mass or traumatic abnormality. There may be a small amount of pericholecystic fluid. The gallbladder otherwise appears normal. No calcified gallstones. The pancreas, spleen, adrenal   glands and kidneys are normal in appearance. There is no abdominal or pelvic lymph node enlargement.    PELVIS: Bowel appears normal. No free intraperitoneal gas or fluid.    MUSCULOSKELETAL: No acute bone fracture.      Impression    IMPRESSION:   1. No acute traumatic abnormality.  2. Heterogeneous enhancement of the liver without focal traumatic abnormality.   UA reflex to Microscopic     Status: Abnormal    Collection Time: 12/29/19  1:36 AM   Result Value Ref Range    Color Urine Yellow     Appearance Urine Clear     Glucose Urine Negative NEG^Negative mg/dL    Bilirubin Urine Negative NEG^Negative    Ketones Urine 20 (A) NEG^Negative mg/dL    Specific Gravity Urine 1.025 1.003 - 1.035    Blood Urine Negative NEG^Negative    pH Urine 7.0 5.0 - 7.0 pH    Protein Albumin Urine Negative NEG^Negative mg/dL    Urobilinogen mg/dL 0.0 0.0 - 2.0 mg/dL    Nitrite Urine Negative NEG^Negative    Leukocyte Esterase Urine Negative NEG^Negative    Source Midstream Urine    Drug abuse screen urine     Status: Abnormal    Collection Time: 12/29/19  1:36 AM   Result Value Ref Range    Amphetamine Qual Urine Negative NEG^Negative    Barbiturates Qual Urine Negative NEG^Negative    Benzodiazepine Qual Urine Negative NEG^Negative    Cannabinoids Qual Urine Positive (A) NEG^Negative    Cocaine Qual Urine Negative NEG^Negative    Opiates Qualitative Urine Negative NEG^Negative    PCP Qual Urine Negative NEG^Negative         ED MEDICATIONS:   Medications   ondansetron (ZOFRAN) injection 4 mg (4 mg Intravenous Given 12/28/19 2341)   LORazepam (ATIVAN) injection 1 mg (1 mg Intravenous Given 12/28/19 2323)   0.9% sodium chloride BOLUS (0 mLs Intravenous Stopped 12/29/19 0220)   iopamidol  (ISOVUE-370) solution 78 mL (78 mLs Intravenous Given 12/28/19 8486)   sodium chloride 0.9 % bag 500mL for CT scan flush use (59 mLs Intravenous Given 12/28/19 2356)   haloperidol lactate (HALDOL) injection 5 mg (5 mg Intravenous Given 12/29/19 0104)         MDM:    Jose Maria Rai is a 19 year old male who presents to the department for evaluation of generalized abdominal pain with nausea and vomiting.  He was afebrile at presentation and rapid influenza testing negative.  CT scan of abdomen/pelvis read by radiologist as having no acute abnormality.  His symptoms gradually improved with IV fluids and antiemetics, my repeat abdominal examination was benign without tenderness or guarding.  Patient has been ambulatory and use the restroom.  Says that symptoms seem to have resolved.  It is possible that he suffered from viral gastroenteritis or episode of hyperemesis, no known history of inflammatory bowel disease, recommend oral hydration and close monitoring for expected improvement.  His mother eventually arrived, results were reviewed and she is in agreement with discharge plan including follow-up instructions.        Disclaimer:  This note consists of symbols derived from keyboarding, dictation, and/or voice recognition software.  As a result, there may be errors in the script that have gone undetected.  Please consider this when interpreting information found in the chart.              Impression:    ICD-10-CM    1. Nausea, vomiting, and diarrhea R11.2     R19.7    2. Abdominal pain, generalized R10.84            DO Prosper Jarquin Thomas V, DO  12/29/19 0318

## 2020-08-27 DIAGNOSIS — F41.9 ANXIETY: ICD-10-CM

## 2020-08-27 RX ORDER — PAROXETINE 20 MG/1
20 TABLET, FILM COATED ORAL AT BEDTIME
Qty: 30 TABLET | Refills: 0 | Status: SHIPPED | OUTPATIENT
Start: 2020-08-27 | End: 2020-09-22

## 2020-08-27 NOTE — TELEPHONE ENCOUNTER
Refilled #30.  Please call patient to have him schedule clinic appointment - he is overdue to see a provider.

## 2020-08-27 NOTE — TELEPHONE ENCOUNTER
"Requested Prescriptions   Pending Prescriptions Disp Refills     PARoxetine (PAXIL) 20 MG tablet [Pharmacy Med Name: PAROXETINE HCL 20 MG TABLET] 90 tablet 2     Sig: TAKE 1 TABLET BY MOUTH AT BEDTIME       SSRIs Protocol Failed - 8/27/2020 12:10 AM        Failed - Recent (12 mo) or future (30 days) visit within the authorizing provider's specialty     Patient has had an office visit with the authorizing provider or a provider within the authorizing providers department within the previous 12 mos or has a future within next 30 days. See \"Patient Info\" tab in inbasket, or \"Choose Columns\" in Meds & Orders section of the refill encounter.              Passed - Medication is active on med list        Passed - Patient is age 18 or older           KATHRYN-7 SCORE 6/19/2019 7/31/2019   Total Score 13 2     PHQ 6/19/2019 7/31/2019   PHQ-9 Total Score 5 2   Q9: Thoughts of better off dead/self-harm past 2 weeks Not at all Not at all     "

## 2020-08-27 NOTE — TELEPHONE ENCOUNTER
Routing refill request to provider for review/approval because:  A break in medication  Patient needs to be seen because:  Had virtual visit on 7-31-19.  Patient uses for anxiety.    KATHRYN-7 SCORE 6/19/2019 7/31/2019   Total Score 13 2     PHQ 6/19/2019 7/31/2019   PHQ-9 Total Score 5 2   Q9: Thoughts of better off dead/self-harm past 2 weeks       Last prescribed by Dr. Law but he is out till next Monday, will send to PCP for review.    VANNESSA Sharpe

## 2020-09-21 DIAGNOSIS — F41.9 ANXIETY: ICD-10-CM

## 2020-09-21 NOTE — TELEPHONE ENCOUNTER
"Requested Prescriptions   Pending Prescriptions Disp Refills     PARoxetine (PAXIL) 20 MG tablet [Pharmacy Med Name: PAROXETINE HCL 20 MG TABLET] 30 tablet 0     Sig: TAKE 1 TABLET (20 MG) BY MOUTH AT BEDTIME NEEDS APPOINTMENT       SSRIs Protocol Failed - 9/21/2020  8:34 AM  KATHRYN-7 SCORE 6/19/2019 7/31/2019   Total Score 13 2     PHQ 6/19/2019 7/31/2019   PHQ-9 Total Score 5 2   Q9: Thoughts of better off dead/self-harm past 2 weeks Not at all Not at all           Failed - Recent (12 mo) or future (30 days) visit within the authorizing provider's specialty     Patient has had an office visit with the authorizing provider or a provider within the authorizing providers department within the previous 12 mos or has a future within next 30 days. See \"Patient Info\" tab in inbasket, or \"Choose Columns\" in Meds & Orders section of the refill encounter.              Passed - Medication is active on med list        Passed - Patient is age 18 or older             "

## 2020-09-22 RX ORDER — PAROXETINE 20 MG/1
20 TABLET, FILM COATED ORAL AT BEDTIME
Qty: 30 TABLET | Refills: 0 | Status: SHIPPED | OUTPATIENT
Start: 2020-09-22 | End: 2020-10-26

## 2020-09-22 NOTE — TELEPHONE ENCOUNTER
"Called him and advised an appt, it has been more than a year since he was seen.   \"ok, \"   Is up at college right now, will check his schedule.   Advised we can do one month refill until he can be seen \"ok, that would be great, and I will schedule,thanks, \"  Susie Valencia RNC    "

## 2020-10-23 DIAGNOSIS — F41.9 ANXIETY: ICD-10-CM

## 2020-10-23 NOTE — TELEPHONE ENCOUNTER
Routing refill request to provider for review/approval because:  Patient needs to be seen because it has been more than 1 year since last office visit.    Elza CRYSTAL RN BSN

## 2020-10-23 NOTE — LETTER
[facility]  [address] [place] [address]  Phone: [phone]       October 27, 2020         Jose Maria Rai  [address]  [facility] [place] [facility] [address]            Dear Jose Maria:    We are concerned about your health care.  We recently provided you with medication refills.  Many medications require routine follow-up with your doctor.    Your prescription(s) have been refilled for 1 month so you may have time for the above noted follow-up. Please call to schedule soon so we can assure you have an appointment before your next refills are needed.    Thank you,      Sander Morris MD / [facility]dias

## 2020-10-23 NOTE — TELEPHONE ENCOUNTER
"Requested Prescriptions   Pending Prescriptions Disp Refills     PARoxetine (PAXIL) 20 MG tablet [Pharmacy Med Name: PAROXETINE HCL 20 MG TABLET] 30 tablet 0     Sig: TAKE 1 TABLET (20 MG) BY MOUTH AT BEDTIME NEEDS APPOINTMENT       SSRIs Protocol Failed - 10/23/2020 10:32 AM        Failed - Recent (12 mo) or future (30 days) visit within the authorizing provider's specialty     Patient has had an office visit with the authorizing provider or a provider within the authorizing providers department within the previous 12 mos or has a future within next 30 days. See \"Patient Info\" tab in inbasket, or \"Choose Columns\" in Meds & Orders section of the refill encounter.              Passed - Medication is active on med list        Passed - Patient is age 18 or older             "

## 2020-10-26 RX ORDER — PAROXETINE 20 MG/1
20 TABLET, FILM COATED ORAL AT BEDTIME
Qty: 30 TABLET | Refills: 0 | Status: SHIPPED | OUTPATIENT
Start: 2020-10-26 | End: 2020-11-20

## 2020-10-26 NOTE — TELEPHONE ENCOUNTER
Refilled #30.  Please call patient to have him schedule clinic appointment. I have not seen patient since June 2018.

## 2020-11-14 ENCOUNTER — HEALTH MAINTENANCE LETTER (OUTPATIENT)
Age: 20
End: 2020-11-14

## 2020-11-20 DIAGNOSIS — F41.9 ANXIETY: ICD-10-CM

## 2020-11-20 RX ORDER — PAROXETINE 20 MG/1
20 TABLET, FILM COATED ORAL AT BEDTIME
Qty: 15 TABLET | Refills: 0 | Status: SHIPPED | OUTPATIENT
Start: 2020-11-20 | End: 2020-12-10

## 2020-11-20 NOTE — TELEPHONE ENCOUNTER
Routing refill request to provider for review/approval because:  Hannah given x1 and patient did not follow up, please advise  Pended 15 tabs.

## 2020-11-20 NOTE — TELEPHONE ENCOUNTER
"Requested Prescriptions   Pending Prescriptions Disp Refills     PARoxetine (PAXIL) 20 MG tablet [Pharmacy Med Name: PAROXETINE HCL 20 MG TABLET] 30 tablet 0     Sig: TAKE 1 TABLET (20 MG) BY MOUTH AT BEDTIME NEEDS APPOINTMENT. NO OTHER REFILL UNTIL SEEN BY PROVIDER       SSRIs Protocol Passed - 11/20/2020  7:31 AM        Passed - Recent (12 mo) or future (30 days) visit within the authorizing provider's specialty     Patient has had an office visit with the authorizing provider or a provider within the authorizing providers department within the previous 12 mos or has a future within next 30 days. See \"Patient Info\" tab in inbasket, or \"Choose Columns\" in Meds & Orders section of the refill encounter.              Passed - Medication is active on med list        Passed - Patient is age 18 or older             "

## 2020-12-08 DIAGNOSIS — F41.9 ANXIETY: ICD-10-CM

## 2020-12-08 NOTE — TELEPHONE ENCOUNTER
"Requested Prescriptions   Pending Prescriptions Disp Refills     PARoxetine (PAXIL) 20 MG tablet [Pharmacy Med Name: PAROXETINE HCL 20 MG TABLET] 15 tablet 0     Sig: TAKE 1 TABLET (20 MG) BY MOUTH AT BEDTIME NEEDS APPOINTMENT. NO OTHER REFILL UNTIL SEEN BY PROVIDER       SSRIs Protocol Failed - 12/8/2020  7:33 AM        Failed - Recent (12 mo) or future (30 days) visit within the authorizing provider's specialty     Patient has had an office visit with the authorizing provider or a provider within the authorizing providers department within the previous 12 mos or has a future within next 30 days. See \"Patient Info\" tab in inbasket, or \"Choose Columns\" in Meds & Orders section of the refill encounter.              Passed - Medication is active on med list        Passed - Patient is age 18 or older             "

## 2020-12-08 NOTE — LETTER
December 11, 2020      Jose Maria Rai  [address] [place] [address]        Dear Jose Maria,     We received a refill request for your paroxetine medication.  This medication has been refilled for 30 days as you are due for an office visit for further refills.  Please call [phone] to schedule an appointment.        Sincerely,        Dr. Sander Morris

## 2020-12-10 RX ORDER — PAROXETINE 20 MG/1
20 TABLET, FILM COATED ORAL AT BEDTIME
Qty: 15 TABLET | Refills: 0 | Status: SHIPPED | OUTPATIENT
Start: 2020-12-10 | End: 2021-01-04 | Stop reason: ALTCHOICE

## 2020-12-23 DIAGNOSIS — F41.9 ANXIETY: ICD-10-CM

## 2020-12-23 NOTE — TELEPHONE ENCOUNTER
"Requested Prescriptions   Pending Prescriptions Disp Refills     PARoxetine (PAXIL) 20 MG tablet [Pharmacy Med Name: PAROXETINE HCL 20 MG TABLET] 15 tablet 0     Sig: TAKE 1 TABLET BY MOUTH AT BEDTIME NEEDS APPOINTMENT. NO OTHER REFILL UNTIL SEEN BY PROVIDER       SSRIs Protocol Failed - 12/23/2020  8:32 AM        Failed - Recent (12 mo) or future (30 days) visit within the authorizing provider's specialty     Patient has had an office visit with the authorizing provider or a provider within the authorizing providers department within the previous 12 mos or has a future within next 30 days. See \"Patient Info\" tab in inbasket, or \"Choose Columns\" in Meds & Orders section of the refill encounter.              Passed - Medication is active on med list        Passed - Patient is age 18 or older           "

## 2020-12-29 RX ORDER — PAROXETINE 20 MG/1
TABLET, FILM COATED ORAL
Qty: 15 TABLET | Refills: 0 | OUTPATIENT
Start: 2020-12-29

## 2020-12-29 NOTE — TELEPHONE ENCOUNTER
Patient has been given multiple dilip refills but has not made appointment. Please call him to let him know he needs to see a provider before a refill is given.

## 2020-12-30 ENCOUNTER — MYC REFILL (OUTPATIENT)
Dept: FAMILY MEDICINE | Facility: CLINIC | Age: 20
End: 2020-12-30

## 2020-12-30 DIAGNOSIS — F41.9 ANXIETY: ICD-10-CM

## 2020-12-30 RX ORDER — PAROXETINE 20 MG/1
20 TABLET, FILM COATED ORAL AT BEDTIME
Qty: 15 TABLET | Refills: 0 | Status: CANCELLED | OUTPATIENT
Start: 2020-12-30

## 2021-01-01 ENCOUNTER — MYC REFILL (OUTPATIENT)
Dept: FAMILY MEDICINE | Facility: CLINIC | Age: 21
End: 2021-01-01

## 2021-01-01 DIAGNOSIS — F41.9 ANXIETY: ICD-10-CM

## 2021-01-01 RX ORDER — PAROXETINE 20 MG/1
20 TABLET, FILM COATED ORAL AT BEDTIME
Qty: 15 TABLET | Refills: 0 | Status: CANCELLED | OUTPATIENT
Start: 2021-01-01

## 2021-01-04 ENCOUNTER — VIRTUAL VISIT (OUTPATIENT)
Dept: FAMILY MEDICINE | Facility: CLINIC | Age: 21
End: 2021-01-04

## 2021-01-04 DIAGNOSIS — F41.9 ANXIETY: ICD-10-CM

## 2021-01-04 PROCEDURE — 99214 OFFICE O/P EST MOD 30 MIN: CPT | Mod: 95 | Performed by: FAMILY MEDICINE

## 2021-01-04 RX ORDER — PAROXETINE 20 MG/1
20 TABLET, FILM COATED ORAL AT BEDTIME
Qty: 15 TABLET | Refills: 0 | Status: CANCELLED | OUTPATIENT
Start: 2021-01-04

## 2021-01-04 ASSESSMENT — ANXIETY QUESTIONNAIRES
2. NOT BEING ABLE TO STOP OR CONTROL WORRYING: NOT AT ALL
1. FEELING NERVOUS, ANXIOUS, OR ON EDGE: NOT AT ALL
3. WORRYING TOO MUCH ABOUT DIFFERENT THINGS: SEVERAL DAYS
GAD7 TOTAL SCORE: 3
IF YOU CHECKED OFF ANY PROBLEMS ON THIS QUESTIONNAIRE, HOW DIFFICULT HAVE THESE PROBLEMS MADE IT FOR YOU TO DO YOUR WORK, TAKE CARE OF THINGS AT HOME, OR GET ALONG WITH OTHER PEOPLE: NOT DIFFICULT AT ALL
6. BECOMING EASILY ANNOYED OR IRRITABLE: SEVERAL DAYS
7. FEELING AFRAID AS IF SOMETHING AWFUL MIGHT HAPPEN: NOT AT ALL
5. BEING SO RESTLESS THAT IT IS HARD TO SIT STILL: NOT AT ALL

## 2021-01-04 ASSESSMENT — PATIENT HEALTH QUESTIONNAIRE - PHQ9
5. POOR APPETITE OR OVEREATING: SEVERAL DAYS
SUM OF ALL RESPONSES TO PHQ QUESTIONS 1-9: 4

## 2021-01-04 NOTE — PROGRESS NOTES
Jose Maria Rai is a 20 year old male who is being evaluated via a billable video visit.      How would you like to obtain your AVS? MyChart  If the video visit is dropped, the invitation should be resent by: Text to cell phone: [phone]  Will anyone else be joining your video visit? No      Video Start Time: 11:11 am  Assessment & Plan     Anxiety  Uncontrolled. Had side effects with paroxetine.  Discussed with patient lobo gomez. He said his sister is on sertraline with no side effects.  Discussed use of a different ssri than paroxetine vs trying a different class of med.  Patient prefers to try sertraline. This is actually a good choice with a positive response in a family member.  Advised use of sertraline, expected possible ADRs, and expected outcomes.  Consider CBT.  Return precautions discussed and given to patient.    - sertraline (ZOLOFT) 50 MG tablet  Dispense: 30 tablet; Refill: 0             Patient Instructions   Stop paroxetine.  Start sertraline as prescribed.    Take your medication as directed.  Full effect/benefit of the medications may not be evident until a few weeks after start.  Treatment of anxiety may also  involve counseling. Take adequate sleep, and exercise regularly.  If you experience suicidal thoughts, thoughts of hurting others or hallucinations, see a doctor right away.        Patient Education     Anxiety Reaction  Anxiety is the feeling we all get when we think something bad might happen. It is a normal response to stress and usually causes only a mild reaction. When anxiety becomes more severe, it can interfere with daily life. In some cases, you may not even be aware of what it is you re anxious about. There may also be a genetic link or it may be a learned behavior in the home.  Both psychological and physical triggers cause stress reaction. It's often a response to fear or emotional stress, real or imagined. This stress may come from home, family, work, or social  relationships.  During an anxiety reaction, you may feel:    Helpless    Nervous    Depressed    Irritable  Your body may show signs of anxiety in many ways. You may experience:    Dry mouth    Shakiness    Dizziness    Weakness    Trouble breathing    Breathing fast (hyperventilating)    Chest pressure    Sweating    Headache    Nausea    Diarrhea    Tiredness    Inability to sleep    Sexual problems  Home care    Try to locate the sources of stress in your life. They may not be obvious. These may include:  ? Daily hassles of life (such as traffic jams, missed appointments, or car troubles)  ? Major life changes, both good (new baby or job promotion) and bad (loss of job or loss of loved one)  ? Overload: feeling that you have too many responsibilities and can't take care of all of them at once  ? Feeling helpless or feeling that your problems are beyond what you re able to solve    Notice how your body reacts to stress. Learn to listen to your body signals. This will help you take action before the stress becomes severe.    When you can, do something about the source of your stress. (Avoid hassles, limit the amount of change that happens in your life at one time and take a break when you feel overloaded).    Unfortunately, many stressful situations can't be avoided. It is necessary to learn how to better manage stress. There are many proven methods that will reduce your anxiety. These include simple things like exercise, good nutrition, and adequate rest. Also, there are certain techniques that are helpful:  ? Relaxation  ? Breathing exercises  ? Visualization  ? Biofeedback  ? Meditation  For more information about this, consult your healthcare provider or go to a local bookstore and review the many books and tapes available on this subject.  Follow-up care  If you feel that your anxiety is not responding to self-help measures, contact your healthcare provider or make an appointment with a counselor. You may need  short-term psychological counseling and temporary medicine to help you manage stress.  Call 911  Call 911 if any of these happen:    Trouble breathing    Confusion    Drowsiness or trouble wakening    Fainting or loss of consciousness    Rapid heart rate    Seizure    New chest pain that becomes more severe, lasts longer, or spreads into your shoulder, arm, neck, jaw, or back  When to seek medical advice  Call your healthcare provider right away if any of these happen:    Your symptoms get worse    Severe headache not relieved by rest and mild pain reliever  [employer] last reviewed this educational content on 10/1/2017    6604-9527 [employer]. [address] [place] [id number]. All rights reserved. This information is not intended as a substitute for professional medical care. Always follow your healthcare professional's instructions.           Patient Education     Treating Anxiety Disorders with Medicine  An anxiety disorder can make you feel nervous or apprehensive, even without a clear reason. In people age 65 and older, generalized anxiety disorder is one of the most commonly diagnosed anxiety disorders. Many times it occurs with depression. Certain anxiety disorders can cause intense feelings of fear or panic. You may even have physical symptoms such as a racing heartbeat, sweating, or dizziness. If you have these feelings, you don t have to suffer anymore. Treatment to help you overcome your fears will likely include therapy (also called counseling). Medicine may also be prescribed to help control your symptoms.     Medicines  Certain medicines may be prescribed to help control your symptoms. So you may feel less anxious. You may also feel able to move forward with therapy. At first, medicines and dosages may need to be adjusted to find what works best for you. Try to be patient. Tell your healthcare provider how a medicine makes you feel. This way, you can work together to find the  treatment that s best for you. Keep in mind that medicines can have side effects. Talk with your provider about any side effects that are bothering you. Changing the dose or type of medicine may help. Don t stop taking medicine on your own. That can cause symptoms to come back or cause dangerous withdrawal symptoms.     Anti-anxiety medicine. This medicine eases symptoms and helps you relax. Your healthcare provider will explain when and how to use it. It may be prescribed for use before situations that make you anxious. You may also be told to take medicine on a regular schedule. Anti-anxiety medicine may make you feel a little sleepy or  out of it.  Don t drive a car or operate machinery while on this medicine, until you know how it affects you.  Never use alcohol or other drugs with anti-anxiety medicines. This could result in loss of muscular control, sedation, coma, or death. Also, use only the amount of medicine prescribed for you. If you think you may have taken too much, get emergency care right away. Never share your medicines with others. Store these medicines in a safe place that can't be reached by children or visitors.   Keep taking medicines as prescribed  Never change your dosage, share or use another person's medicine, or stop taking your medicines without talking to your healthcare provider first. Keep the following in mind:     Some medicines must be taken on a schedule. Make this part of your daily routine. For instance, always take your pill before brushing your teeth. A pillbox can help you remember if you ve taken your medicine each day.    Medicines are often taken for 6 to 12 months. Your healthcare provider will then evaluate whether you need to stay on them. Many people who have also had therapy may no longer need medicine to manage anxiety.    You may need to stop taking medicine slowly to give your body time to adjust. When it s time to stop, your healthcare provider will tell you more.  Remember: Never stop taking your medicine without talking to your provider first.    If symptoms return, you may need to start taking medicines again.  This isn t your fault. It s just the nature of your anxiety disorder.  What to think about    Side effects. Medicines may cause side effects. Ask your healthcare provider or pharmacist what you can expect. They may have ideas for avoiding some side effects.    Sexual problems. Some antidepressants can affect your desire for sex or your ability to have an orgasm. A change in dosage or medicine often solves the problem. If you have a sexual side effect that concerns you, tell your healthcare provider.    Addiction. If you ve never had a problem with drugs or alcohol, you may not have a problem with medicines used to treat anxiety disorders. But always discuss the medicines with your healthcare provider before taking them. If you have a history of addiction, you may not be able to use certain medicines used to treat anxiety disorders.    Medicine interactions. Always check with your pharmacist before using any over-the-counter medicines (OTCs), including herbal supplements. Some OTCs may interact with your anti-anxiety medicines and increase or decrease their effectiveness.    [employer] last reviewed this educational content on 12/1/2019 2000-2020 [employer]. [address] [place] [address]. All rights reserved. This information is not intended as a substitute for professional medical care. Always follow your healthcare professional's instructions.               No follow-ups on file.    Sander Morris MD  [facility]     Jose Maria Rai is a 20 year old male who presents to clinic today for the following health issues     HPI   Chief Complaint   Patient presents with     Anxiety         Anxiety Follow-Up    How are you doing with your anxiety since your last visit? Improved     Are you having other  symptoms that might be associated with anxiety? No    Have you had a significant life event? No     Are you feeling depressed? No    Do you have any concerns with your use of alcohol or other drugs? No    Social History     Tobacco Use     Smoking status: Never Smoker     Smokeless tobacco: Never Used   Substance Use Topics     Alcohol use: Yes     Comment: 12 beers per weekend     Drug use: Yes     Types: Marijuana     KATHRYN-7 SCORE 6/19/2019 7/31/2019 1/4/2021   Total Score 13 2 3     PHQ 6/19/2019 7/31/2019 1/4/2021   PHQ-9 Total Score 5 2 4   Q9: Thoughts of better off dead/self-harm past 2 weeks Not at all Not at all Not at all     Last PHQ-9 1/4/2021   1.  Little interest or pleasure in doing things 0   2.  Feeling down, depressed, or hopeless 0   3.  Trouble falling or staying asleep, or sleeping too much 1   4.  Feeling tired or having little energy 1   5.  Poor appetite or overeating 0   6.  Feeling bad about yourself 0   7.  Trouble concentrating 2   8.  Moving slowly or restless 0   Q9: Thoughts of better off dead/self-harm past 2 weeks 0   PHQ-9 Total Score 4   Difficulty at work, home, or with people Not difficult at all     KATHRYN-7  1/4/2021   1. Feeling nervous, anxious, or on edge 0   2. Not being able to stop or control worrying 0   3. Worrying too much about different things 1   4. Trouble relaxing 1   5. Being so restless that it is hard to sit still 0   6. Becoming easily annoyed or irritable 1   7. Feeling afraid, as if something awful might happen 0   KATHRYN-7 Total Score 3   If you checked any problems, how difficult have they made it for you to do your work, take care of things at home, or get along with other people? Not difficult at all         How many servings of fruits and vegetables do you eat daily?  2-3    On average, how many sweetened beverages do you drink each day (Examples: soda, juice, sweet tea, etc.  Do NOT count diet or artificially sweetened beverages)?   0    How many days per  week do you exercise enough to make your heart beat faster? 7    How many minutes a day do you exercise enough to make your heart beat faster? 60 or more    How many days per week do you miss taking your medication? Has missed the past 4 days due to running out         Review of Systems   C: NEGATIVE for fever, chills, change in weight  I: NEGATIVE for worrisome rashes, moles or lesions  E: NEGATIVE for vision changes or irritation  E/M: NEGATIVE for ear, mouth and throat problems  R: NEGATIVE for significant cough or SOB  CV: NEGATIVE for chest pain, palpitations or peripheral edema  GI: NEGATIVE for nausea, abdominal pain, heartburn, or change in bowel habits  : NEGATIVE for frequency, dysuria, or hematuria  N: NEGATIVE for weakness, dizziness or paresthesias  E: NEGATIVE for temperature intolerance, skin/hair changes  PSYCHIATRIC: see above      Objective           Vitals:  No vitals were obtained today due to virtual visit.    Physical Exam   GENERAL: appears well-nourished, alert and no distress  EYES: Eyes grossly normal to inspection.  No discharge or erythema, or obvious scleral/conjunctival abnormalities.  RESP: No audible wheeze, cough, or visible cyanosis.  No visible retractions or increased work of breathing.    SKIN: Visible skin clear. No significant rash, abnormal pigmentation or lesions.  NEURO: Cranial nerves grossly intact.  Mentation and speech appropriate for age.  PSYCH: Mentation appears normal, affect normal/bright, judgement and insight intact, normal speech and appearance well-groomed.    No results found for any visits on 01/04/21.            Video-Visit Details    Type of service:  Video Visit    Video End Time:11:34 AM    Originating Location (pt. Location): Home    Distant Location (provider location):  [facility]     Platform used for Video Visit: Yakaz

## 2021-01-04 NOTE — PATIENT INSTRUCTIONS
Stop paroxetine.  Start sertraline as prescribed.    Take your medication as directed.  Full effect/benefit of the medications may not be evident until a few weeks after start.  Treatment of anxiety may also  involve counseling. Take adequate sleep, and exercise regularly.  If you experience suicidal thoughts, thoughts of hurting others or hallucinations, see a doctor right away.        Patient Education     Anxiety Reaction  Anxiety is the feeling we all get when we think something bad might happen. It is a normal response to stress and usually causes only a mild reaction. When anxiety becomes more severe, it can interfere with daily life. In some cases, you may not even be aware of what it is you re anxious about. There may also be a genetic link or it may be a learned behavior in the home.  Both psychological and physical triggers cause stress reaction. It's often a response to fear or emotional stress, real or imagined. This stress may come from home, family, work, or social relationships.  During an anxiety reaction, you may feel:    Helpless    Nervous    Depressed    Irritable  Your body may show signs of anxiety in many ways. You may experience:    Dry mouth    Shakiness    Dizziness    Weakness    Trouble breathing    Breathing fast (hyperventilating)    Chest pressure    Sweating    Headache    Nausea    Diarrhea    Tiredness    Inability to sleep    Sexual problems  Home care    Try to locate the sources of stress in your life. They may not be obvious. These may include:  ? Daily hassles of life (such as traffic jams, missed appointments, or car troubles)  ? Major life changes, both good (new baby or job promotion) and bad (loss of job or loss of loved one)  ? Overload: feeling that you have too many responsibilities and can't take care of all of them at once  ? Feeling helpless or feeling that your problems are beyond what you re able to solve    Notice how your body reacts to stress. Learn to listen to  your body signals. This will help you take action before the stress becomes severe.    When you can, do something about the source of your stress. (Avoid hassles, limit the amount of change that happens in your life at one time and take a break when you feel overloaded).    Unfortunately, many stressful situations can't be avoided. It is necessary to learn how to better manage stress. There are many proven methods that will reduce your anxiety. These include simple things like exercise, good nutrition, and adequate rest. Also, there are certain techniques that are helpful:  ? Relaxation  ? Breathing exercises  ? Visualization  ? Biofeedback  ? Meditation  For more information about this, consult your healthcare provider or go to a local bookstore and review the many books and tapes available on this subject.  Follow-up care  If you feel that your anxiety is not responding to self-help measures, contact your healthcare provider or make an appointment with a counselor. You may need short-term psychological counseling and temporary medicine to help you manage stress.  Call 911  Call 911 if any of these happen:    Trouble breathing    Confusion    Drowsiness or trouble wakening    Fainting or loss of consciousness    Rapid heart rate    Seizure    New chest pain that becomes more severe, lasts longer, or spreads into your shoulder, arm, neck, jaw, or back  When to seek medical advice  Call your healthcare provider right away if any of these happen:    Your symptoms get worse    Severe headache not relieved by rest and mild pain reliever  [employer] last reviewed this educational content on 10/1/2017    9953-1708 [employer]. [address] [place] [address], [place] [address]. All rights reserved. This information is not intended as a substitute for professional medical care. Always follow your healthcare professional's instructions.           Patient Education     Treating Anxiety Disorders with Medicine  An anxiety  disorder can make you feel nervous or apprehensive, even without a clear reason. In people age 65 and older, generalized anxiety disorder is one of the most commonly diagnosed anxiety disorders. Many times it occurs with depression. Certain anxiety disorders can cause intense feelings of fear or panic. You may even have physical symptoms such as a racing heartbeat, sweating, or dizziness. If you have these feelings, you don t have to suffer anymore. Treatment to help you overcome your fears will likely include therapy (also called counseling). Medicine may also be prescribed to help control your symptoms.     Medicines  Certain medicines may be prescribed to help control your symptoms. So you may feel less anxious. You may also feel able to move forward with therapy. At first, medicines and dosages may need to be adjusted to find what works best for you. Try to be patient. Tell your healthcare provider how a medicine makes you feel. This way, you can work together to find the treatment that s best for you. Keep in mind that medicines can have side effects. Talk with your provider about any side effects that are bothering you. Changing the dose or type of medicine may help. Don t stop taking medicine on your own. That can cause symptoms to come back or cause dangerous withdrawal symptoms.     Anti-anxiety medicine. This medicine eases symptoms and helps you relax. Your healthcare provider will explain when and how to use it. It may be prescribed for use before situations that make you anxious. You may also be told to take medicine on a regular schedule. Anti-anxiety medicine may make you feel a little sleepy or  out of it.  Don t drive a car or operate machinery while on this medicine, until you know how it affects you.  Never use alcohol or other drugs with anti-anxiety medicines. This could result in loss of muscular control, sedation, coma, or death. Also, use only the amount of medicine prescribed for you. If you  think you may have taken too much, get emergency care right away. Never share your medicines with others. Store these medicines in a safe place that can't be reached by children or visitors.   Keep taking medicines as prescribed  Never change your dosage, share or use another person's medicine, or stop taking your medicines without talking to your healthcare provider first. Keep the following in mind:     Some medicines must be taken on a schedule. Make this part of your daily routine. For instance, always take your pill before brushing your teeth. A pillbox can help you remember if you ve taken your medicine each day.    Medicines are often taken for 6 to 12 months. Your healthcare provider will then evaluate whether you need to stay on them. Many people who have also had therapy may no longer need medicine to manage anxiety.    You may need to stop taking medicine slowly to give your body time to adjust. When it s time to stop, your healthcare provider will tell you more. Remember: Never stop taking your medicine without talking to your provider first.    If symptoms return, you may need to start taking medicines again.  This isn t your fault. It s just the nature of your anxiety disorder.  What to think about    Side effects. Medicines may cause side effects. Ask your healthcare provider or pharmacist what you can expect. They may have ideas for avoiding some side effects.    Sexual problems. Some antidepressants can affect your desire for sex or your ability to have an orgasm. A change in dosage or medicine often solves the problem. If you have a sexual side effect that concerns you, tell your healthcare provider.    Addiction. If you ve never had a problem with drugs or alcohol, you may not have a problem with medicines used to treat anxiety disorders. But always discuss the medicines with your healthcare provider before taking them. If you have a history of addiction, you may not be able to use certain  medicines used to treat anxiety disorders.    Medicine interactions. Always check with your pharmacist before using any over-the-counter medicines (OTCs), including herbal supplements. Some OTCs may interact with your anti-anxiety medicines and increase or decrease their effectiveness.    [employer] last reviewed this educational content on 12/1/2019 2000-2020 [employer]. [address] [place] [address], [place] [address]. All rights reserved. This information is not intended as a substitute for professional medical care. Always follow your healthcare professional's instructions.

## 2021-01-05 ASSESSMENT — ANXIETY QUESTIONNAIRES: GAD7 TOTAL SCORE: 3

## 2021-01-15 ENCOUNTER — TRANSFERRED RECORDS (OUTPATIENT)
Dept: HEALTH INFORMATION MANAGEMENT | Facility: CLINIC | Age: 21
End: 2021-01-15

## 2021-01-26 DIAGNOSIS — F41.9 ANXIETY: ICD-10-CM

## 2021-01-26 NOTE — TELEPHONE ENCOUNTER
"Requested Prescriptions   Pending Prescriptions Disp Refills     sertraline (ZOLOFT) 50 MG tablet [Pharmacy Med Name: SERTRALINE HCL 50 MG TABLET] 30 tablet 0     Sig: TAKE 1/2 TABLET DAILY FOR 7 DAYS, THEN INCREASE TO 1 TABLET BY MOUTH DAILY.       SSRIs Protocol Passed - 1/26/2021 10:31 AM        Passed - Recent (12 mo) or future (30 days) visit within the authorizing provider's specialty     Patient has had an office visit with the authorizing provider or a provider within the authorizing providers department within the previous 12 mos or has a future within next 30 days. See \"Patient Info\" tab in inbasket, or \"Choose Columns\" in Meds & Orders section of the refill encounter.              Passed - Medication is active on med list        Passed - Patient is age 18 or older             "

## 2021-01-28 NOTE — TELEPHONE ENCOUNTER
Medication is being filled for 1 time refill only due to:  Patient needs to be seen because Due for follow up after starting new med.  Nafisa Garcia RN

## 2021-02-24 DIAGNOSIS — F41.9 ANXIETY: ICD-10-CM

## 2021-02-24 NOTE — TELEPHONE ENCOUNTER
"Requested Prescriptions   Pending Prescriptions Disp Refills     sertraline (ZOLOFT) 50 MG tablet [Pharmacy Med Name: SERTRALINE HCL 50 MG TABLET] 30 tablet 0     Sig: TAKE 1 TABLET BY MOUTH DAILY. NEEDS FOLLOW UP APPOINTMENT       SSRIs Protocol Passed - 2/24/2021  1:32 PM        Passed - Recent (12 mo) or future (30 days) visit within the authorizing provider's specialty     Patient has had an office visit with the authorizing provider or a provider within the authorizing providers department within the previous 12 mos or has a future within next 30 days. See \"Patient Info\" tab in inbasket, or \"Choose Columns\" in Meds & Orders section of the refill encounter.              Passed - Medication is active on med list        Passed - Patient is age 18 or older             "

## 2021-03-26 DIAGNOSIS — F41.9 ANXIETY: ICD-10-CM

## 2021-03-26 NOTE — TELEPHONE ENCOUNTER
"Requested Prescriptions   Pending Prescriptions Disp Refills     sertraline (ZOLOFT) 50 MG tablet [Pharmacy Med Name: SERTRALINE HCL 50 MG TABLET] 30 tablet 0     Sig: TAKE 1 TABLET BY MOUTH DAILY. NEEDS FOLLOW UP APPOINTMENT       SSRIs Protocol Passed - 3/26/2021  9:30 AM        Passed - Recent (12 mo) or future (30 days) visit within the authorizing provider's specialty     Patient has had an office visit with the authorizing provider or a provider within the authorizing providers department within the previous 12 mos or has a future within next 30 days. See \"Patient Info\" tab in inbasket, or \"Choose Columns\" in Meds & Orders section of the refill encounter.              Passed - Medication is active on med list        Passed - Patient is age 18 or older             "

## 2021-04-19 DIAGNOSIS — F41.9 ANXIETY: ICD-10-CM

## 2021-05-19 DIAGNOSIS — F41.9 ANXIETY: ICD-10-CM

## 2021-05-30 NOTE — ANESTHESIA POSTPROCEDURE EVALUATION
Patient: Jose Maria JOSÉ WITH  FISTULOTOMY , CURETTAGE OF FISTULA AND MARSUPIALIZATION  Anesthesia type: general    Patient location: phase 2  Last vitals:   Vitals Value Taken Time   /61 7/12/2019  2:30 PM   Temp 36.2  C (97.2  F) 7/12/2019  1:10 PM   Pulse 81 7/12/2019  2:32 PM   Resp 16 7/12/2019  1:30 PM   SpO2 99 % 7/12/2019  2:32 PM   Vitals shown include unvalidated device data  Note is a late entry owing to clinical demands.  Post vital signs: stable  Level of consciousness: alert and conversant  Post-anesthesia pain: pain controlled  Post-anesthesia nausea and vomiting: no  Pulmonary: room air  Cardiovascular: stable and blood pressure at baseline  Hydration: adequate  Anesthetic events: no    QCDR Measures:  ASA# 11 - Karrie-op Cardiac Arrest: ASA11B - Patient did NOT experience unanticipated cardiac arrest  ASA# 12 - Karrie-op Mortality Rate: ASA12B - Patient did NOT die  ASA# 13 - PACU Re-Intubation Rate: ASA13B - Patient did NOT require a new airway mgmt  ASA# 10 - Composite Anes Safety: ASA10A - No serious adverse event    Additional Notes:

## 2021-05-30 NOTE — ANESTHESIA CARE TRANSFER NOTE
Last vitals:   Vitals:    07/12/19 1310   BP: 143/73   Pulse: 88   Resp: 16   Temp: 36.2  C (97.2  F)   SpO2: 100%     Patient's level of consciousness is drowsy  Spontaneous respirations: yes  Maintains airway independently: yes  Dentition unchanged: yes  Oropharynx: oropharynx clear of all foreign objects    QCDR Measures:  ASA# 20 - Surgical Safety Checklist: WHO surgical safety checklist completed prior to induction    PQRS# 430 - Adult PONV Prevention: 4558F - Pt received => 2 anti-emetic agents (different classes) preop & intraop  ASA# 8 - Peds PONV Prevention: NA - Not pediatric patient, not GA or 2 or more risk factors NOT present  PQRS# 424 - Karrie-op Temp Management: 4559F - At least one body temp DOCUMENTED => 35.5C or 95.9F within required timeframe  PQRS# 426 - PACU Transfer Protocol: - Transfer of care checklist used  ASA# 14 - Acute Post-op Pain: ASA14B - Patient did NOT experience pain >= 7 out of 10

## 2021-05-30 NOTE — ANESTHESIA PREPROCEDURE EVALUATION
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 His mother states said surgery was placement of a shunt.  No further details.  He is very active and has no issues.        Dental - normal exam                        Anesthesia Plan  Planned anesthetic: general endotracheal    ASA 2   Induction: intravenous   Anesthetic plan and risks discussed with: patient and parent/guardian  Anesthesia plan special considerations: antiemetics,   Post-op plan: routine recovery

## 2021-05-31 ENCOUNTER — E-VISIT (OUTPATIENT)
Dept: FAMILY MEDICINE | Facility: CLINIC | Age: 21
End: 2021-05-31
Payer: COMMERCIAL

## 2021-05-31 DIAGNOSIS — F41.9 ANXIETY: Primary | ICD-10-CM

## 2021-05-31 PROCEDURE — 99207 PR NON-BILLABLE SERV PER CHARTING: CPT | Performed by: FAMILY MEDICINE

## 2021-06-01 ENCOUNTER — E-VISIT (OUTPATIENT)
Dept: FAMILY MEDICINE | Facility: CLINIC | Age: 21
End: 2021-06-01
Payer: COMMERCIAL

## 2021-06-01 DIAGNOSIS — Z76.0 MEDICINE REFILL: Primary | ICD-10-CM

## 2021-06-01 PROCEDURE — 99207 PR NON-BILLABLE SERV PER CHARTING: CPT | Performed by: FAMILY MEDICINE

## 2021-06-01 NOTE — PATIENT INSTRUCTIONS
Thank you for choosing us for your care. I think an in-clinic visit would be best next steps based on your symptoms. Please schedule a clinic appointment; you won t be charged for this eVisit.      You can schedule an appointment right here in [facility], or call [phone]

## 2021-06-01 NOTE — PATIENT INSTRUCTIONS
Thank you for choosing us for your care. I think an in-clinic visit would be best next steps based on your symptoms. Please schedule a clinic appointment; you won t be charged for this eVisit.      You can schedule an appointment right here in [facility], or call [phone]

## 2021-06-02 ENCOUNTER — TELEPHONE (OUTPATIENT)
Dept: FAMILY MEDICINE | Facility: CLINIC | Age: 21
End: 2021-06-02

## 2021-06-03 ENCOUNTER — OFFICE VISIT (OUTPATIENT)
Dept: FAMILY MEDICINE | Facility: CLINIC | Age: 21
End: 2021-06-03
Payer: COMMERCIAL

## 2021-06-03 VITALS
BODY MASS INDEX: 23.64 KG/M2 | SYSTOLIC BLOOD PRESSURE: 100 MMHG | WEIGHT: 156 LBS | TEMPERATURE: 97.5 F | DIASTOLIC BLOOD PRESSURE: 62 MMHG | OXYGEN SATURATION: 99 % | HEART RATE: 80 BPM | RESPIRATION RATE: 16 BRPM | HEIGHT: 68 IN

## 2021-06-03 VITALS — HEIGHT: 68 IN | BODY MASS INDEX: 23.49 KG/M2 | WEIGHT: 155 LBS

## 2021-06-03 DIAGNOSIS — F90.2 ATTENTION DEFICIT HYPERACTIVITY DISORDER (ADHD), COMBINED TYPE: Primary | ICD-10-CM

## 2021-06-03 DIAGNOSIS — F12.90 MARIJUANA USE, EPISODIC: ICD-10-CM

## 2021-06-03 PROCEDURE — 99214 OFFICE O/P EST MOD 30 MIN: CPT | Performed by: FAMILY MEDICINE

## 2021-06-03 RX ORDER — ATOMOXETINE 10 MG/1
10 CAPSULE ORAL DAILY
Qty: 30 CAPSULE | Refills: 0 | Status: SHIPPED | OUTPATIENT
Start: 2021-06-03 | End: 2021-07-02

## 2021-06-03 ASSESSMENT — ANXIETY QUESTIONNAIRES
1. FEELING NERVOUS, ANXIOUS, OR ON EDGE: SEVERAL DAYS
GAD7 TOTAL SCORE: 5
7. FEELING AFRAID AS IF SOMETHING AWFUL MIGHT HAPPEN: MORE THAN HALF THE DAYS
3. WORRYING TOO MUCH ABOUT DIFFERENT THINGS: NOT AT ALL
2. NOT BEING ABLE TO STOP OR CONTROL WORRYING: SEVERAL DAYS
6. BECOMING EASILY ANNOYED OR IRRITABLE: NOT AT ALL
5. BEING SO RESTLESS THAT IT IS HARD TO SIT STILL: SEVERAL DAYS

## 2021-06-03 ASSESSMENT — PATIENT HEALTH QUESTIONNAIRE - PHQ9
5. POOR APPETITE OR OVEREATING: NOT AT ALL
SUM OF ALL RESPONSES TO PHQ QUESTIONS 1-9: 3

## 2021-06-03 ASSESSMENT — MIFFLIN-ST. JEOR: SCORE: 1682.11

## 2021-06-03 NOTE — PATIENT INSTRUCTIONS
You will be contacted in 1-2 business days to get a schedule for the mental health evaluation.    Start atomoxetine 10 mg daily.    Schedule virtual visit for medication recheck in a month.  Patient Education     Attention-Deficit/Hyperactivity Disorder (ADHD) in Adults  You ve always had trouble concentrating. Your mind wanders, and it s hard to finish tasks. As a result, you didn t do well in school. And now, you often struggle with your job. Sometimes this makes you zhang or depressed. These may be symptoms of attention-deficit/hyperactivity disorder (ADHD). To find out more, talk to your healthcare provider. He or she can offer guidance and support.  Symptoms of ADHD in adults  For an adult to be diagnosed with ADHD, the symptoms must have been present since childhood. The symptoms may include:    Trouble thinking things through    Low self-esteem    Depression    Trouble holding a job    Memory problems    Problems with a marriage or relationship    Lack of discipline    Trouble finishing tasks or projects  What is ADHD?  Attention-deficit/hyperactivity disorder makes it hard to focus your mind. You may daydream a lot. And you may be restless much of the time. As a result, you may have trouble with detailed or boring work. And it may be hard to stick with one project for very long. You also may forget things. Or, you may miss key points during a lecture or meeting. You may even have trouble sitting through a movie or concert. At times, you may feel frustrated or angry. This can affect your relationships with others.  Who does it affect?  ADHD starts in childhood. Sometimes, your symptoms may improve as you get older. But they also may persist into your adult years. ADHD is often thought of as a  kid s problem.  That s why it s often missed in adults. In fact, many parents learn they have ADHD when their children are diagnosed.  What causes it?  The exact cause of ADHD isn t known. The disorder does run in  families. Having one parent with ADHD makes it more likely you ll have it too. And the part of your brain that controls attention may be involved. Certain brain chemicals that are out of balance may also play a role.  What can be done?  The first step is finding out if you have ADHD. Your doctor will use special guidelines to diagnose the disorder. Most adults with ADHD are greatly helped by some combination of medicine, therapy and coaching.  To learn more    Children and Adults with Attention-Deficit/Hyperactivity Disorder (IZA) https://iza.org/    Attention Deficit Disorder Association (ADDA) https://add.org/    [employer] last reviewed this educational content on 4/1/2020 2000-2021 [employer]. All rights reserved. This information is not intended as a substitute for professional medical care. Always follow your healthcare professional's instructions.           Patient Education     Coping with Attention Deficit Hyperactivity Disorder (ADHD)  Helpful Tips for Adults  If you have ADHD, it can be hard to sit still, pay attention or control impulsive behavior. ADHD can cause problems in many areas of your life. But you can learn ways to control your symptoms. Below are some ideas for coping with the most common ADHD problems.   Memory, focus and managing time  Be mindful of time.     Use a watch, phone, timer or other device that keeps correct time. Be sure you can see and hear it at all times.    Set more than one alarm to remind you how much time you have left for a task.    Give yourself more time than you think you need.    For important appointments or deadlines, set reminder alarms hours or days ahead of time.  Use a day planner.     A day planner can help you manage time and remember responsibilities.    Learning to use a planner is just like learning to use any tool. Practice makes perfect.  Use lists.     Lists and notes can help you keep track of regular tasks, projects, deadlines and  appointments.    If you decide to use a daily planner, keep all lists and notes inside of it. Use color codes for tasks so you know which ones are the most important.  Learn to say no and set boundaries.     Do not take on too many projects or social plans.    Overbooking yourself can be overwhelming and can lead to missed commitments.  Repeat out loud instructions you have been given.     This will help you remember, as well as let others correct you if needed.  Organization  Create space.     Ask yourself what you need on a daily basis. Then, find storage bins or closets for things you don't need every day.    Have specific areas for things like keys, bills and other items that are easy to misplace.    Throw away or donate things you don't need.  Deal with it now.     You can avoid forgetfulness and clutter by doing things right away, not some time in the future.    This includes filing papers, cleaning up messes, opening and responding to mail and returning phone calls.  Set up a filing system.     Use dividers or separate file folders for different types of documents, such as medical records, receipts and pay stubs.    Label and color-code your files so that you can quickly find what you need.  Break larger tasks into small, manageable pieces.     Write down the steps needed to finish the task. Follow each step in order until the task is done.    If needed, take small, timed breaks and return to finish the task.  Organize your work space.     Use lists or planners to organize your day.    Remove clutter from your desk.    Label any storage bins.    Reduce distraction as much as possible. Ideas include sitting facing the wall, closing your door or using a white noise machine.  Sitting still  Move around as needed.     Don't fight the urge to move around. If you need to fidget or stand up for a period of time to help you pay attention, then do so. But take care that you're not interrupting others.    You may also  find it helpful to squeeze a stress ball.  Be active in a useful way.     Exercise can burn off extra energy, relieve stress, boost your mood and calm your mind.    Meditation, yoga or summer chi can help you better control your attention and impulses.  Stay healthy.     Get enough sleep.    Avoid caffeine late in the day.    Exercise.    Create a relaxing bedtime routine.    Stick to a schedule or daily routine.    Eat small meals throughout the day.    Avoid sugar, eat fewer carbohydrates and eat more protein.  Close relationships  Talk to your loved ones about ADHD.     There are many resources to help your loved one understand ADHD. See the Resources section on the next page.  Divide daily tasks based on each person's strengths.     For example, if you have trouble with organization, you may not want to do financial planning tasks.  If you have trouble with focus, develop a sign that others in your life can use as a gentle reminder to pay attention.    The sign should be small but meaningful to you and the other person.  Improve communication.     Use a dry erase board in a common area to help the whole family stay in touch better.    Keep regular to-do lists and compare scheduled activities every day.    Writing notes to each other is also very helpful.  Be an active listener.     Try not to interrupt.    If you find your mind wandering when others are talking, mentally repeat their words so you can follow the conversation.    Ask questions and ask people to repeat what they said if needed.    Pay close attention to body language.  Organize your thoughts.     If you need to have a serious conversation, write a list of the points you would like to make or the important ideas you want to talk about. This can help you stay focused and remember what you need to say.  Think before speaking.     It can be hard to control your impulses when you feel strongly about something.    Before saying whatever pops into your head,  "stop to ask yourself \"Will this be helpful?\" or \"Will this help me get what I want?\"  Take charge of your life.     Work to accept that some things are harder for you.    Make a plan to address these troubles and seek the support you need.  Resources  Online    ADD [employer]. www.[employer].Tribe    ADHD and Marriage. www.adhdmarCognio.Tribe    ADDitude. www.additudemag.com    Attention Deficit Disorder Association.   www.add.org    Children and Adults with Attention-Deficit/Hyperactivity Disorder (NENA). www.nena.org    Ruth Ortiz. \"33 ADHD Friendly Ways to Get Organized with Adult ADHD.\" www.S[employer].com/adhd/article/729.html    Chris Bliss and Rosa Brian. \"ADHD in Adults.\" www.helpguide.org/mental/adhd_add_adult_strategies.htmYou can also find many apps for your phone that can help you with common ADHD struggles.  Books    Luis Enrique Morris. ADHD in Adults. (2008).    Luis Enrique Morris. Attention-Deficit Hyperactivity Disorder: A Handbook for Diagnosis and Treatment. (2015).    Luis Enrique Morris. Taking Charge of Adult ADHD. (2010).    Anthony Ames. Delivered from Distraction. (2006).    Anthony Ames. Driven to Distraction. (2011).    Claire Reilly, et al. You Mean I'm Not Lazy, Stupid or Crazy?!: The Classic Self-Help Book for Adults with Attention Deficit Disorder. (2006).    Cordelia Brennan. ADD in the Workplace. (1997).    Cordelia Brennan and Ruth Ortiz. ADD-Friendly Ways to Organize Your Life. (2016).    Cordelia Brennan, Alejandrina Gooden, et al. Understanding Girls with ADHD: How They Feel and Why They Do What They Do. (2015).    Gabriela Layne. The ADHD Effect on Marriage: Understand and Rebuild Your Relationship in Six Steps. (2010).    Gabriela Layne and Fanny Mora. The Couple's Guide to Thriving with ADHD. (2014.)  ADHD Support groups    [address] [place]  Visit " www.[employer].org/services/support-groups/adhd-adult-support-group-thursday-eveningsfor information about dates and times, or e-mail info@[employer].org    add.org/adhd-support-groups&#047;(online group)  For informational purposes only. Not to replace the advice of your health care provider. Copyright   2014 [facility]. All rights reserved. Clinically reviewed by [facility]. Language Systems 247751 - REV 01/20.

## 2021-06-03 NOTE — PROGRESS NOTES
Assessment & Plan     Attention deficit hyperactivity disorder (ADHD), combined type  Discussed with patient his symptoms are highly suggestive of  ADHD that  has affected his performance in school in the past and now.  Discussed also with patient that in some cases a co-existing condition can also be present. Hence referral for full evaluation with behavioral health is ordered.  Offered trial of atomoxetine while waiting for mental health eval. This can give patient a chance to improve his grades.  Discussed with patient possible side effects of med: increased stimulation, sleep disturbance, appetite changes, GI upset, palpitations among many others.  Discussed with patient that some adults may not repsond to ADHD meds, and some may only have partial relief.   Reassess for response to treatment in a month.  Advised reasons to contact the care team.  - atomoxetine (STRATTERA) 10 MG capsule  Dispense: 30 capsule; Refill: 0  - MENTAL HEALTH REFERRAL  - Adult; Assessments and Testing; ADHD; [facility]: [facility] [phone]; We will contact you to schedule the appointment or please call with any questions    Marijuana use, epsiodic  Discussed with patient possible adverse health effects of even episodic cannabis use.  Advised can make mental health conditions difficult to treat.  Patient was advised to completely stop recreational use.      Patient Instructions   You will be contacted in 1-2 business days to get a schedule for the mental health evaluation.    Start atomoxetine 10 mg daily.    Schedule virtual visit for medication recheck in a month.  Patient Education     Attention-Deficit/Hyperactivity Disorder (ADHD) in Adults  You ve always had trouble concentrating. Your mind wanders, and it s hard to finish tasks. As a result, you didn t do well in school. And now, you often struggle with your job. Sometimes this makes you zhang or depressed. These may be symptoms of  attention-deficit/hyperactivity disorder (ADHD). To find out more, talk to your healthcare provider. He or she can offer guidance and support.  Symptoms of ADHD in adults  For an adult to be diagnosed with ADHD, the symptoms must have been present since childhood. The symptoms may include:    Trouble thinking things through    Low self-esteem    Depression    Trouble holding a job    Memory problems    Problems with a marriage or relationship    Lack of discipline    Trouble finishing tasks or projects  What is ADHD?  Attention-deficit/hyperactivity disorder makes it hard to focus your mind. You may daydream a lot. And you may be restless much of the time. As a result, you may have trouble with detailed or boring work. And it may be hard to stick with one project for very long. You also may forget things. Or, you may miss key points during a lecture or meeting. You may even have trouble sitting through a movie or concert. At times, you may feel frustrated or angry. This can affect your relationships with others.  Who does it affect?  ADHD starts in childhood. Sometimes, your symptoms may improve as you get older. But they also may persist into your adult years. ADHD is often thought of as a  kid s problem.  That s why it s often missed in adults. In fact, many parents learn they have ADHD when their children are diagnosed.  What causes it?  The exact cause of ADHD isn t known. The disorder does run in families. Having one parent with ADHD makes it more likely you ll have it too. And the part of your brain that controls attention may be involved. Certain brain chemicals that are out of balance may also play a role.  What can be done?  The first step is finding out if you have ADHD. Your doctor will use special guidelines to diagnose the disorder. Most adults with ADHD are greatly helped by some combination of medicine, therapy and coaching.  To learn more    Children and Adults with Attention-Deficit/Hyperactivity  Disorder (IZA) https://iza.org/    Attention Deficit Disorder Association (ADDA) https://add.org/    Van last reviewed this educational content on 4/1/2020 2000-2021 [employer]. All rights reserved. This information is not intended as a substitute for professional medical care. Always follow your healthcare professional's instructions.           Patient Education     Coping with Attention Deficit Hyperactivity Disorder (ADHD)  Helpful Tips for Adults  If you have ADHD, it can be hard to sit still, pay attention or control impulsive behavior. ADHD can cause problems in many areas of your life. But you can learn ways to control your symptoms. Below are some ideas for coping with the most common ADHD problems.   Memory, focus and managing time  Be mindful of time.     Use a watch, phone, timer or other device that keeps correct time. Be sure you can see and hear it at all times.    Set more than one alarm to remind you how much time you have left for a task.    Give yourself more time than you think you need.    For important appointments or deadlines, set reminder alarms hours or days ahead of time.  Use a day planner.     A day planner can help you manage time and remember responsibilities.    Learning to use a planner is just like learning to use any tool. Practice makes perfect.  Use lists.     Lists and notes can help you keep track of regular tasks, projects, deadlines and appointments.    If you decide to use a daily planner, keep all lists and notes inside of it. Use color codes for tasks so you know which ones are the most important.  Learn to say no and set boundaries.     Do not take on too many projects or social plans.    Overbooking yourself can be overwhelming and can lead to missed commitments.  Repeat out loud instructions you have been given.     This will help you remember, as well as let others correct you if needed.  Organization  Create space.     Ask yourself what you  need on a daily basis. Then, find storage bins or closets for things you don't need every day.    Have specific areas for things like keys, bills and other items that are easy to misplace.    Throw away or donate things you don't need.  Deal with it now.     You can avoid forgetfulness and clutter by doing things right away, not some time in the future.    This includes filing papers, cleaning up messes, opening and responding to mail and returning phone calls.  Set up a filing system.     Use dividers or separate file folders for different types of documents, such as medical records, receipts and pay stubs.    Label and color-code your files so that you can quickly find what you need.  Break larger tasks into small, manageable pieces.     Write down the steps needed to finish the task. Follow each step in order until the task is done.    If needed, take small, timed breaks and return to finish the task.  Organize your work space.     Use lists or planners to organize your day.    Remove clutter from your desk.    Label any storage bins.    Reduce distraction as much as possible. Ideas include sitting facing the wall, closing your door or using a white noise machine.  Sitting still  Move around as needed.     Don't fight the urge to move around. If you need to fidget or stand up for a period of time to help you pay attention, then do so. But take care that you're not interrupting others.    You may also find it helpful to squeeze a stress ball.  Be active in a useful way.     Exercise can burn off extra energy, relieve stress, boost your mood and calm your mind.    Meditation, yoga or summer chi can help you better control your attention and impulses.  Stay healthy.     Get enough sleep.    Avoid caffeine late in the day.    Exercise.    Create a relaxing bedtime routine.    Stick to a schedule or daily routine.    Eat small meals throughout the day.    Avoid sugar, eat fewer carbohydrates and eat more protein.  Close  "relationships  Talk to your loved ones about ADHD.     There are many resources to help your loved one understand ADHD. See the Resources section on the next page.  Divide daily tasks based on each person's strengths.     For example, if you have trouble with organization, you may not want to do financial planning tasks.  If you have trouble with focus, develop a sign that others in your life can use as a gentle reminder to pay attention.    The sign should be small but meaningful to you and the other person.  Improve communication.     Use a dry erase board in a common area to help the whole family stay in touch better.    Keep regular to-do lists and compare scheduled activities every day.    Writing notes to each other is also very helpful.  Be an active listener.     Try not to interrupt.    If you find your mind wandering when others are talking, mentally repeat their words so you can follow the conversation.    Ask questions and ask people to repeat what they said if needed.    Pay close attention to body language.  Organize your thoughts.     If you need to have a serious conversation, write a list of the points you would like to make or the important ideas you want to talk about. This can help you stay focused and remember what you need to say.  Think before speaking.     It can be hard to control your impulses when you feel strongly about something.    Before saying whatever pops into your head, stop to ask yourself \"Will this be helpful?\" or \"Will this help me get what I want?\"  Take charge of your life.     Work to accept that some things are harder for you.    Make a plan to address these troubles and seek the support you need.  Resources  Online    ADD Warehouse. www.addwarehouse.com    ADHD and Marriage. www.adhdmarriage.com    ADDitude. www.additudemag.com    Attention Deficit Disorder Association.   www.add.org    Children and Adults with Attention-Deficit/Hyperactivity Disorder (IZA). " "www.nena.org    Ruth Ortiz. \"33 ADHD Friendly Ways to Get Organized with Adult ADHD.\" www.addThe Black Tuxmag.com/adhd/article/729.html    Chris Bliss and Rosa Brian. \"ADHD in Adults.\" www.helpguide.org/mental/adhd_add_adult_strategies.htmYou can also find many apps for your phone that can help you with common ADHD struggles.  Books    Luis Enrique Morris. ADHD in Adults. (2008).    Luis Enrique Morris. Attention-Deficit Hyperactivity Disorder: A Handbook for Diagnosis and Treatment. (2015).    Luis Enrique Morris. Taking Charge of Adult ADHD. (2010).    Anthony Turpin and Ward Ames. Delivered from Distraction. (2006).    Anthony Turpin and Ward Ames. Driven to Distraction. (2011).    Claire Reilly, et al. You Mean I'm Not Lazy, Stupid or Crazy?!: The Classic Self-Help Book for Adults with Attention Deficit Disorder. (2006).    Cordelia Brennan. ADD in the Workplace. (1997).    Cordelia Brennan and Ruth Ortiz. ADD-Friendly Ways to Organize Your Life. (2016).    Cordelia Brennan, Alejandrina Gooden, et al. Understanding Girls with ADHD: How They Feel and Why They Do What They Do. (2015).    Gabriela Layne. The ADHD Effect on Marriage: Understand and Rebuild Your Relationship in Six Steps. (2010).    Gabriela Layne and Fanny Mora. The Couple's Guide to Thriving with ADHD. (2014.)  ADHD Support groups    [address]  Visit www.[facility].ClipCard/services/support-groups/adhd-adult-support-group-thursday-eveningsfor information about dates and times, or e-mail info@[facility].ClipCard    add.org/adhd-support-groups&#047;(online group)  For informational purposes only. Not to replace the advice of your health care provider. Copyright   2014 [facility]. All rights reserved. Clinically reviewed by [facility]. [employer] 585065 - REV 01/20.               Return in about 1 month (around 7/3/2021) for Virtual visit for " medication follow up.    Sander Morris MD  [facility] RUSLAN Moise is a 21 year old who presents for the following health issues     HPI     Depression and Anxiety Follow-Up    How are you doing with your depression since your last visit? Improved     How are you doing with your anxiety since your last visit?  Improved     Are you having other symptoms that might be associated with depression or anxiety? No    Have you had a significant life event? Grief or Loss     Do you have any concerns with your use of alcohol or other drugs? No    Social History     Tobacco Use     Smoking status: Never Smoker     Smokeless tobacco: Never Used   Substance Use Topics     Alcohol use: Yes     Comment: 12 beers per weekend     Drug use: Yes     Types: Marijuana     PHQ 6/19/2019 7/31/2019 1/4/2021   PHQ-9 Total Score 5 2 4   Q9: Thoughts of better off dead/self-harm past 2 weeks Not at all Not at all Not at all     KATHRYN-7 SCORE 6/19/2019 7/31/2019 1/4/2021   Total Score 13 2 3     Last PHQ-9 1/4/2021   1.  Little interest or pleasure in doing things 0   2.  Feeling down, depressed, or hopeless 0   3.  Trouble falling or staying asleep, or sleeping too much 1   4.  Feeling tired or having little energy 1   5.  Poor appetite or overeating 0   6.  Feeling bad about yourself 0   7.  Trouble concentrating 2   8.  Moving slowly or restless 0   Q9: Thoughts of better off dead/self-harm past 2 weeks 0   PHQ-9 Total Score 4   Difficulty at work, home, or with people Not difficult at all     KATHRYN-7  1/4/2021   1. Feeling nervous, anxious, or on edge 0   2. Not being able to stop or control worrying 0   3. Worrying too much about different things 1   4. Trouble relaxing 1   5. Being so restless that it is hard to sit still 0   6. Becoming easily annoyed or irritable 1   7. Feeling afraid, as if something awful might happen 0   KATHRYN-7 Total Score 3   If you checked any problems, how difficult have they  "made it for you to do your work, take care of things at home, or get along with other people? Not difficult at all       Suicide Assessment Five-step Evaluation and Treatment (SAFE-T)      How many servings of fruits and vegetables do you eat daily?  2-3    On average, how many sweetened beverages do you drink each day (Examples: soda, juice, sweet tea, etc.  Do NOT count diet or artificially sweetened beverages)?   0    How many days per week do you exercise enough to make your heart beat faster? 7    How many minutes a day do you exercise enough to make your heart beat faster? 30 - 60  How many days per week do you miss taking your medication? Not often    What makes it hard for you to take your medications?  Forgetting it     ADHD    Onset: since childhood     Description:   Easily distracted: YES- may be doing a task in school but his mind can be 'somewhere else'  Short attention span: YES- after 15 minutes of studying  he feels need freya \"read something new\".  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  Family history of ADHD: unknown  Have you had an evaluation for ADHD in the past: no  Do you use alcohol or drugs: YES- smokes weed once a week    Therapies tried: medication not used     Patient Active Problem List   Diagnosis     Pulmonary valve disorder     Eczema     Submandibular duct obstruction     History of chicken pox     Learning problem     Anxiety     Attention deficit " "hyperactivity disorder (ADHD), combined type     Past Surgical History:   Procedure Laterality Date     SURGICAL HISTORY OF -   07/24/00    BMT     SURGICAL HISTORY OF -   06/02/99    Heart Surgery       Social History     Tobacco Use     Smoking status: Never Smoker     Smokeless tobacco: Never Used   Substance Use Topics     Alcohol use: Yes     Comment: 12 beers per weekend     Family History   Problem Relation Age of Onset     Cancer Paternal Grandmother          Current Outpatient Medications   Medication Sig Dispense Refill     atomoxetine (STRATTERA) 10 MG capsule Take 1 capsule (10 mg) by mouth daily 30 capsule 0     sertraline (ZOLOFT) 50 MG tablet TAKE 1 TABLET BY MOUTH DAILY. 90 tablet 3     No Known Allergies  Review of Systems   C: NEGATIVE for fever, chills, change in weight  I: NEGATIVE for worrisome rashes, moles or lesions  E: NEGATIVE for vision changes or irritation  E/M: NEGATIVE for ear, mouth and throat problems  R: NEGATIVE for significant cough or SOB  CV: NEGATIVE for chest pain, palpitations or peripheral edema  GI: NEGATIVE for nausea, abdominal pain, heartburn, or change in bowel habits  : NEGATIVE for frequency, dysuria, or hematuria  N: NEGATIVE for weakness, dizziness or paresthesias  E: NEGATIVE for temperature intolerance, skin/hair changes  PSYCHIATRIC: see above      Objective    /62   Pulse 80   Temp 97.5  F (36.4  C) (Tympanic)   Resp 16   Ht 1.727 m (5' 8\")   Wt 70.8 kg (156 lb)   SpO2 99%   BMI 23.72 kg/m    Body mass index is 23.72 kg/m .  Physical Exam   GENERAL: well-nourished, alert and no distress  EYES: no icterus, PERRLA  SKIN: no jaundice/rash  CV: normal rate, regular rhythm, no murmur  LUNGS: clear to auscultation bilaterally   NEURO: no tremors  PSYCH: well-kempt, linear thought process, normal speech, fidgety,  good insight/judgement, normal mood, appropriate affect, no suicidality, no aggression, no hallucination    No results found for any visits on " 06/03/21.

## 2021-06-04 ASSESSMENT — ANXIETY QUESTIONNAIRES: GAD7 TOTAL SCORE: 5

## 2021-06-29 DIAGNOSIS — F90.2 ATTENTION DEFICIT HYPERACTIVITY DISORDER (ADHD), COMBINED TYPE: ICD-10-CM

## 2021-07-02 RX ORDER — ATOMOXETINE 10 MG/1
CAPSULE ORAL
Qty: 30 CAPSULE | Refills: 0 | Status: SHIPPED | OUTPATIENT
Start: 2021-07-02 | End: 2021-07-08

## 2021-07-02 NOTE — TELEPHONE ENCOUNTER
"Has appt (video) 7/8/21 with Dr. Morris    Requested Prescriptions   Pending Prescriptions Disp Refills     atomoxetine (STRATTERA) 10 MG capsule [Pharmacy Med Name: ATOMOXETINE HCL 10 MG CAPSULE] 30 capsule 0     Sig: TAKE 1 CAPSULE BY MOUTH EVERY DAY       Attention Deficit/Hyperactivity Disorder (ADHD) Agents Protocol Failed - 6/29/2021  1:48 PM        Failed - Request is not 1st request after initial or after a dose change.     Please review patient refills to confirm     This refill request isn't the 1st refill following initial prescription   OR     This refill request is not the 1st refill following a dose change.  If either of the above 2 are TRUE, patient must have had a recent visit within the past 30 days with the authorizing provider or a provider within his/her clinic AND specialty.           Passed - Blood pressure under 140/90 in past 12 months     BP Readings from Last 3 Encounters:   06/03/21 100/62   12/29/19 (!) 143/80   07/09/19 122/60                 Passed - Patient is age 6 or older        Passed - Medication is active on med list        Passed - Recent (6 mo) or future (30 days)  visit within the authorizing provider's specialty     Patient had office visit in the last 6 months or has a visit in the next 30 days with authorizing provider or within the authorizing provider's specialty.  See \"Patient Info\" tab in inbasket, or \"Choose Columns\" in Meds & Orders section of the refill encounter.                         "

## 2021-07-08 ENCOUNTER — VIRTUAL VISIT (OUTPATIENT)
Dept: FAMILY MEDICINE | Facility: CLINIC | Age: 21
End: 2021-07-08
Payer: COMMERCIAL

## 2021-07-08 ENCOUNTER — MYC MEDICAL ADVICE (OUTPATIENT)
Dept: FAMILY MEDICINE | Facility: CLINIC | Age: 21
End: 2021-07-08

## 2021-07-08 DIAGNOSIS — F90.2 ATTENTION DEFICIT HYPERACTIVITY DISORDER (ADHD), COMBINED TYPE: ICD-10-CM

## 2021-07-08 PROCEDURE — 99214 OFFICE O/P EST MOD 30 MIN: CPT | Mod: 95 | Performed by: FAMILY MEDICINE

## 2021-07-08 RX ORDER — ATOMOXETINE 18 MG/1
18 CAPSULE ORAL DAILY
Qty: 30 CAPSULE | Refills: 0 | Status: SHIPPED | OUTPATIENT
Start: 2021-07-08 | End: 2021-08-13

## 2021-07-08 NOTE — PROGRESS NOTES
Jose Maira is a 21 year old who is being evaluated via a billable video visit.      How would you like to obtain your AVS? Mail a copy  If the video visit is dropped, the invitation should be resent by: Text to cell phone: [phone]  Will anyone else be joining your video visit? No    Video Start Time: 10:30 am    Assessment & Plan     Attention deficit hyperactivity disorder (ADHD), combined type  Still symptomatic.  Discussed option to increase medication dose. Patient concurred.  Reinforced behavior and coping tips to improve focus and staying on task.  Consider collab psych consult if still no improvement in spite of dose adjustments.  - atomoxetine (STRATTERA) 18 MG capsule  Dispense: 30 capsule; Refill: 0       Patient Instructions   You concurred to increase strattera dose to the next higher one.   Rx for strattera 18 mg daily has been sent to your pharmacy.    Continue to use ways to keep your focus and stay on task for your things to do.      Patient Education     Coping with Attention Deficit Hyperactivity Disorder (ADHD)  Helpful Tips for Adults  If you have ADHD, it can be hard to sit still, pay attention or control impulsive behavior. ADHD can cause problems in many areas of your life. But you can learn ways to control your symptoms. Below are some ideas for coping with the most common ADHD problems.   Memory, focus and managing time  Be mindful of time.     Use a watch, phone, timer or other device that keeps correct time. Be sure you can see and hear it at all times.    Set more than one alarm to remind you how much time you have left for a task.    Give yourself more time than you think you need.    For important appointments or deadlines, set reminder alarms hours or days ahead of time.  Use a day planner.     A day planner can help you manage time and remember responsibilities.    Learning to use a planner is just like learning to use any tool. Practice makes perfect.  Use lists.     Lists and  notes can help you keep track of regular tasks, projects, deadlines and appointments.    If you decide to use a daily planner, keep all lists and notes inside of it. Use color codes for tasks so you know which ones are the most important.  Learn to say no and set boundaries.     Do not take on too many projects or social plans.    Overbooking yourself can be overwhelming and can lead to missed commitments.  Repeat out loud instructions you have been given.     This will help you remember, as well as let others correct you if needed.  Organization  Create space.     Ask yourself what you need on a daily basis. Then, find storage bins or closets for things you don't need every day.    Have specific areas for things like keys, bills and other items that are easy to misplace.    Throw away or donate things you don't need.  Deal with it now.     You can avoid forgetfulness and clutter by doing things right away, not some time in the future.    This includes filing papers, cleaning up messes, opening and responding to mail and returning phone calls.  Set up a filing system.     Use dividers or separate file folders for different types of documents, such as medical records, receipts and pay stubs.    Label and color-code your files so that you can quickly find what you need.  Break larger tasks into small, manageable pieces.     Write down the steps needed to finish the task. Follow each step in order until the task is done.    If needed, take small, timed breaks and return to finish the task.  Organize your work space.     Use lists or planners to organize your day.    Remove clutter from your desk.    Label any storage bins.    Reduce distraction as much as possible. Ideas include sitting facing the wall, closing your door or using a white noise machine.  Sitting still  Move around as needed.     Don't fight the urge to move around. If you need to fidget or stand up for a period of time to help you pay attention, then do  so. But take care that you're not interrupting others.    You may also find it helpful to squeeze a stress ball.  Be active in a useful way.     Exercise can burn off extra energy, relieve stress, boost your mood and calm your mind.    Meditation, yoga or summer chi can help you better control your attention and impulses.  Stay healthy.     Get enough sleep.    Avoid caffeine late in the day.    Exercise.    Create a relaxing bedtime routine.    Stick to a schedule or daily routine.    Eat small meals throughout the day.    Avoid sugar, eat fewer carbohydrates and eat more protein.  Close relationships  Talk to your loved ones about ADHD.     There are many resources to help your loved one understand ADHD. See the Resources section on the next page.  Divide daily tasks based on each person's strengths.     For example, if you have trouble with organization, you may not want to do financial planning tasks.  If you have trouble with focus, develop a sign that others in your life can use as a gentle reminder to pay attention.    The sign should be small but meaningful to you and the other person.  Improve communication.     Use a dry erase board in a common area to help the whole family stay in touch better.    Keep regular to-do lists and compare scheduled activities every day.    Writing notes to each other is also very helpful.  Be an active listener.     Try not to interrupt.    If you find your mind wandering when others are talking, mentally repeat their words so you can follow the conversation.    Ask questions and ask people to repeat what they said if needed.    Pay close attention to body language.  Organize your thoughts.     If you need to have a serious conversation, write a list of the points you would like to make or the important ideas you want to talk about. This can help you stay focused and remember what you need to say.  Think before speaking.     It can be hard to control your impulses when you feel  "strongly about something.    Before saying whatever pops into your head, stop to ask yourself \"Will this be helpful?\" or \"Will this help me get what I want?\"  Take charge of your life.     Work to accept that some things are harder for you.    Make a plan to address these troubles and seek the support you need.  Resources  Online    ADD Warehouse. www.Bantr.Swiss[employer] Mobile    ADHD and Marriage. www.DreamHost.Swissmed Mobile    ADDitude. www.additudemag.com    Attention Deficit Disorder Association.   www.add.org    Children and Adults with Attention-Deficit/Hyperactivity Disorder (NENA). www.nena.org    Ruth Ortiz. \"33 ADHD Friendly Ways to Get Organized with Adult ADHD.\" www.[employer].com/adhd/article/729.html    Chris Bliss and Rosa Brian. \"ADHD in Adults.\" www.helpguide.org/mental/adhd_add_adult_strategies.htmYou can also find many apps for your phone that can help you with common ADHD struggles.  Books    Luis Enrique Morris. ADHD in Adults. (2008).    Luis Enrique Morris. Attention-Deficit Hyperactivity Disorder: A Handbook for Diagnosis and Treatment. (2015).    Luis Enrique Morris. Taking Charge of Adult ADHD. (2010).    Anthony Ames. Delivered from Distraction. (2006).    Anthony Ames. Driven to Distraction. (2011).    Rosalia Noe, Claire James, et al. You Mean I'm Not Lazy, Stupid or Crazy?!: The Classic Self-Help Book for Adults with Attention Deficit Disorder. (2006).    Cordelia Brennan. ADD in the Workplace. (1997).    Cordelia Brennan and Ruth Ortiz. ADD-Friendly Ways to Organize Your Life. (2016).    Cordelia Brennan, Alejandrina Gooden, et al. Understanding Girls with ADHD: How They Feel and Why They Do What They Do. (2015).    Gabriela Layne. The ADHD Effect on Marriage: Understand and Rebuild Your Relationship in Six Steps. (2010).    Gabriela Layne and Fanny Mora. The Couple's Guide to Thriving with ADHD. (2014.)  ADHD Support groups    [facility]  [address] [facility]  Visit www.Patreon.org/services/support-groups/adhd-adult-support-group-thursday-eveningsfor information about dates and times, or e-mail info@Patreon.org    add.org/adhd-support-groups&#047;(online group)  For informational purposes only. Not to replace the advice of your health care provider. Copyright   2014 [facility]. All rights reserved. Clinically reviewed by [facility]. My [employer]ist [id number] - REV 01/20.               Return in about 1 month (around 8/8/2021) for Virtual visit for strattera follow up.    Sander Morris MD  [facility] RUSLAN Moise is a 21 year old who presents for the following health issues:  Chief Complaint   Patient presents with     Refill Request     Pt being seen for f/u on adhd medication.       HPI     Medication Followup of strattera    Taking Medication as prescribed: yes    Side Effects:  None    Medication Helping Symptoms:  NO-pt does not notice any change.   Patient reports he still tends to have a difficult time focusing on finishing one task at a time, and finds himself jumping from one thought to another frequently.  He does not notice this as much when school is out.  Is able to keep his job currently.    Patient denies appetite changes, sleep disruption, weight fluctuations, unusual behavior, mood swings or aggressive behavior.    Review of Systems   C: NEGATIVE for fever, chills, change in weight  I: NEGATIVE for worrisome rashes, moles or lesions  E: NEGATIVE for vision changes or irritation  E/M: NEGATIVE for ear, mouth and throat problems  R: NEGATIVE for significant cough or SOB  CV: NEGATIVE for chest pain, palpitations or peripheral edema  GI: NEGATIVE for nausea, abdominal pain, heartburn, or change in bowel habits  : NEGATIVE for frequency, dysuria, or hematuria  N: NEGATIVE for weakness, dizziness or paresthesias  E: NEGATIVE for temperature  intolerance, skin/hair changes  PSYCHIATRIC: see above      Objective           Vitals:  No vitals were obtained today due to virtual visit.    Physical Exam   GENERAL: Healthy, alert and no distress  EYES: Eyes grossly normal to inspection.  No discharge or erythema, or obvious scleral/conjunctival abnormalities.  RESP: No audible wheeze, cough, or visible cyanosis.  No visible retractions or increased work of breathing.    SKIN: Visible skin clear. No significant rash, abnormal pigmentation or lesions.  NEURO: Cranial nerves grossly intact.  Mentation and speech appropriate for age.  PSYCH: Mentation appears normal, affect normal/bright, judgement and insight intact, normal speech and appearance well-groomed.    No results found for any visits on 07/08/21.        Video-Visit Details    Type of service:  Video Visit    Video End Time:10:40 am    Originating Location (pt. Location): Home    Distant Location (provider location):  [facility]     Platform used for Video Visit: Newsana

## 2021-07-08 NOTE — PATIENT INSTRUCTIONS
You concurred to increase strattera dose to the next higher one.   Rx for strattera 18 mg daily has been sent to your pharmacy.    Continue to use ways to keep your focus and stay on task for your things to do.      Patient Education     Coping with Attention Deficit Hyperactivity Disorder (ADHD)  Helpful Tips for Adults  If you have ADHD, it can be hard to sit still, pay attention or control impulsive behavior. ADHD can cause problems in many areas of your life. But you can learn ways to control your symptoms. Below are some ideas for coping with the most common ADHD problems.   Memory, focus and managing time  Be mindful of time.     Use a watch, phone, timer or other device that keeps correct time. Be sure you can see and hear it at all times.    Set more than one alarm to remind you how much time you have left for a task.    Give yourself more time than you think you need.    For important appointments or deadlines, set reminder alarms hours or days ahead of time.  Use a day planner.     A day planner can help you manage time and remember responsibilities.    Learning to use a planner is just like learning to use any tool. Practice makes perfect.  Use lists.     Lists and notes can help you keep track of regular tasks, projects, deadlines and appointments.    If you decide to use a daily planner, keep all lists and notes inside of it. Use color codes for tasks so you know which ones are the most important.  Learn to say no and set boundaries.     Do not take on too many projects or social plans.    Overbooking yourself can be overwhelming and can lead to missed commitments.  Repeat out loud instructions you have been given.     This will help you remember, as well as let others correct you if needed.  Organization  Create space.     Ask yourself what you need on a daily basis. Then, find storage bins or closets for things you don't need every day.    Have specific areas for things like keys, bills and other  items that are easy to misplace.    Throw away or donate things you don't need.  Deal with it now.     You can avoid forgetfulness and clutter by doing things right away, not some time in the future.    This includes filing papers, cleaning up messes, opening and responding to mail and returning phone calls.  Set up a filing system.     Use dividers or separate file folders for different types of documents, such as medical records, receipts and pay stubs.    Label and color-code your files so that you can quickly find what you need.  Break larger tasks into small, manageable pieces.     Write down the steps needed to finish the task. Follow each step in order until the task is done.    If needed, take small, timed breaks and return to finish the task.  Organize your work space.     Use lists or planners to organize your day.    Remove clutter from your desk.    Label any storage bins.    Reduce distraction as much as possible. Ideas include sitting facing the wall, closing your door or using a white noise machine.  Sitting still  Move around as needed.     Don't fight the urge to move around. If you need to fidget or stand up for a period of time to help you pay attention, then do so. But take care that you're not interrupting others.    You may also find it helpful to squeeze a stress ball.  Be active in a useful way.     Exercise can burn off extra energy, relieve stress, boost your mood and calm your mind.    Meditation, yoga or summer chi can help you better control your attention and impulses.  Stay healthy.     Get enough sleep.    Avoid caffeine late in the day.    Exercise.    Create a relaxing bedtime routine.    Stick to a schedule or daily routine.    Eat small meals throughout the day.    Avoid sugar, eat fewer carbohydrates and eat more protein.  Close relationships  Talk to your loved ones about ADHD.     There are many resources to help your loved one understand ADHD. See the Resources section on the  "next page.  Divide daily tasks based on each person's strengths.     For example, if you have trouble with organization, you may not want to do financial planning tasks.  If you have trouble with focus, develop a sign that others in your life can use as a gentle reminder to pay attention.    The sign should be small but meaningful to you and the other person.  Improve communication.     Use a dry erase board in a common area to help the whole family stay in touch better.    Keep regular to-do lists and compare scheduled activities every day.    Writing notes to each other is also very helpful.  Be an active listener.     Try not to interrupt.    If you find your mind wandering when others are talking, mentally repeat their words so you can follow the conversation.    Ask questions and ask people to repeat what they said if needed.    Pay close attention to body language.  Organize your thoughts.     If you need to have a serious conversation, write a list of the points you would like to make or the important ideas you want to talk about. This can help you stay focused and remember what you need to say.  Think before speaking.     It can be hard to control your impulses when you feel strongly about something.    Before saying whatever pops into your head, stop to ask yourself \"Will this be helpful?\" or \"Will this help me get what I want?\"  Take charge of your life.     Work to accept that some things are harder for you.    Make a plan to address these troubles and seek the support you need.  Resources  Online    ADD Warehouse. www.Mobiotics.[employer] Instruments    ADHD and Marriage. www.adhdmarShopistan.com    ADDitude. www.additudemag.com    Attention Deficit Disorder Association.   www.add.org    Children and Adults with Attention-Deficit/Hyperactivity Disorder (IZA). www.iza.org    Ruth Ortiz. \"33 ADHD Friendly Ways to Get Organized with Adult ADHD.\" www.FameBitg.com/adhd/article/729.html    Chris Mcgraw " "Roc. \"ADHD in Adults.\" www.helpguide.org/mental/adhd_add_adult_strategies.htmYou can also find many apps for your phone that can help you with common ADHD struggles.  Books    Luis Enrique Morris. ADHD in Adults. (2008).    Luis Enrique Morris. Attention-Deficit Hyperactivity Disorder: A Handbook for Diagnosis and Treatment. (2015).    Luis Enrique Morris. Taking Charge of Adult ADHD. (2010).    Anthony Turpin and Ward Ames. Delivered from Distraction. (2006).    Anthony Turpin and Ward Ames. Driven to Distraction. (2011).    Rosalia Noe, Claire James, et al. You Mean I'm Not Lazy, Stupid or Crazy?!: The Classic Self-Help Book for Adults with Attention Deficit Disorder. (2006).    Cordelia Brennan. ADD in the Workplace. (1997).    Cordelia Brennan and Ruth Ortiz. ADD-Friendly Ways to Organize Your Life. (2016).    Cordelia Brennan, Alejandrina Gooden, et al. Understanding Girls with ADHD: How They Feel and Why They Do What They Do. (2015).    Gabriela Layne. The ADHD Effect on Marriage: Understand and Rebuild Your Relationship in Six Steps. (2010).    Gabriela Layne and Fanny Mora. The Couple's Guide to Thriving with ADHD. (2014.)  ADHD Support groups    [address]  Visit www.[facility] [facility].Plug.dj/services/support-groups/adhd-adult-support-group-thursday-eveningsfor information about dates and times, or e-mail info@Powerphotonic.Plug.dj    add.org/adhd-support-groups&#047;(online group)  For informational purposes only. Not to replace the advice of your health care provider. Copyright   2014 [employer]. All rights reserved. Clinically reviewed by [facility]. [employer] [id number] - REV 01/20.           "

## 2021-07-08 NOTE — TELEPHONE ENCOUNTER
Mycharted patient that he needs to re-schedule the appointment.  Tanika Deng CSS on 7/8/2021 at 10:43 AM

## 2021-08-12 DIAGNOSIS — F90.2 ATTENTION DEFICIT HYPERACTIVITY DISORDER (ADHD), COMBINED TYPE: ICD-10-CM

## 2021-08-13 RX ORDER — ATOMOXETINE 18 MG/1
18 CAPSULE ORAL DAILY
Qty: 30 CAPSULE | Refills: 0 | Status: SHIPPED | OUTPATIENT
Start: 2021-08-13 | End: 2021-09-15

## 2021-09-14 DIAGNOSIS — F90.2 ATTENTION DEFICIT HYPERACTIVITY DISORDER (ADHD), COMBINED TYPE: ICD-10-CM

## 2021-09-14 NOTE — LETTER
[facility]  [address] [place] [address]  Phone: [phone]       September 16, 2021         Jose Maria Rai  [address]  [place] [facility] [place] [address]            Dear Jose Maria:    We are concerned about your health care.  We recently provided you with medication refills.  Many medications require routine follow-up with your doctor.    Your prescription(s) have been refilled for 30 days so you may have time for the above noted follow-up. Please call to schedule a virtual visit soon so we can assure you have an appointment before your next refills are needed.    Thank you,      Sander Morris MD / Mili PRICE RN

## 2021-09-15 RX ORDER — ATOMOXETINE 18 MG/1
18 CAPSULE ORAL DAILY
Qty: 30 CAPSULE | Refills: 0 | Status: SHIPPED | OUTPATIENT
Start: 2021-09-15 | End: 2021-10-14

## 2021-09-16 NOTE — TELEPHONE ENCOUNTER
Actually was speaking to the patient about setting up a virtual visit and the phone disconnected.  Called the patient back and got a voicemail but am unable to leave a message as it is full.  Sent a letter to the patient. Mili PRICE RN

## 2021-10-14 ENCOUNTER — TELEPHONE (OUTPATIENT)
Dept: FAMILY MEDICINE | Facility: CLINIC | Age: 21
End: 2021-10-14

## 2021-10-14 DIAGNOSIS — F90.2 ATTENTION DEFICIT HYPERACTIVITY DISORDER (ADHD), COMBINED TYPE: ICD-10-CM

## 2021-10-14 RX ORDER — ATOMOXETINE 18 MG/1
18 CAPSULE ORAL DAILY
Qty: 90 CAPSULE | Refills: 0 | Status: SHIPPED | OUTPATIENT
Start: 2021-10-14 | End: 2023-01-30

## 2021-10-14 NOTE — TELEPHONE ENCOUNTER
Signed for 90 days, recommend follow up with PCP for future refills    ERIKA Clark CNP, covering for Dr. Morris

## 2021-10-19 ENCOUNTER — VIRTUAL VISIT (OUTPATIENT)
Dept: FAMILY MEDICINE | Facility: CLINIC | Age: 21
End: 2021-10-19
Payer: COMMERCIAL

## 2021-10-19 DIAGNOSIS — F90.2 ATTENTION DEFICIT HYPERACTIVITY DISORDER (ADHD), COMBINED TYPE: Primary | ICD-10-CM

## 2021-10-19 PROCEDURE — 99214 OFFICE O/P EST MOD 30 MIN: CPT | Mod: 95 | Performed by: FAMILY MEDICINE

## 2021-10-19 NOTE — PATIENT INSTRUCTIONS
You concurred with referral to collaborative psychiatry - expet a call from the  in the next few business days.  If no call by Monday next week, call the referral number.    It looks like you had a refill of strattera sent last week. For 90 days.    Keep taking this for now.      Patient Education     Coping with Attention Deficit Hyperactivity Disorder (ADHD)  Helpful Tips for Adults  If you have ADHD, it can be hard to sit still, pay attention or control impulsive behavior. ADHD can cause problems in many areas of your life. But you can learn ways to control your symptoms. Below are some ideas for coping with the most common ADHD problems.   Memory, focus and managing time  Be mindful of time.     Use a watch, phone, timer or other device that keeps correct time. Be sure you can see and hear it at all times.    Set more than one alarm to remind you how much time you have left for a task.    Give yourself more time than you think you need.    For important appointments or deadlines, set reminder alarms hours or days ahead of time.  Use a day planner.     A day planner can help you manage time and remember responsibilities.    Learning to use a planner is just like learning to use any tool. Practice makes perfect.  Use lists.     Lists and notes can help you keep track of regular tasks, projects, deadlines and appointments.    If you decide to use a daily planner, keep all lists and notes inside of it. Use color codes for tasks so you know which ones are the most important.  Learn to say no and set boundaries.     Do not take on too many projects or social plans.    Overbooking yourself can be overwhelming and can lead to missed commitments.  Repeat out loud instructions you have been given.     This will help you remember, as well as let others correct you if needed.  Organization  Create space.     Ask yourself what you need on a daily basis. Then, find storage bins or closets for things you don't need  every day.    Have specific areas for things like keys, bills and other items that are easy to misplace.    Throw away or donate things you don't need.  Deal with it now.     You can avoid forgetfulness and clutter by doing things right away, not some time in the future.    This includes filing papers, cleaning up messes, opening and responding to mail and returning phone calls.  Set up a filing system.     Use dividers or separate file folders for different types of documents, such as medical records, receipts and pay stubs.    Label and color-code your files so that you can quickly find what you need.  Break larger tasks into small, manageable pieces.     Write down the steps needed to finish the task. Follow each step in order until the task is done.    If needed, take small, timed breaks and return to finish the task.  Organize your work space.     Use lists or planners to organize your day.    Remove clutter from your desk.    Label any storage bins.    Reduce distraction as much as possible. Ideas include sitting facing the wall, closing your door or using a white noise machine.  Sitting still  Move around as needed.     Don't fight the urge to move around. If you need to fidget or stand up for a period of time to help you pay attention, then do so. But take care that you're not interrupting others.    You may also find it helpful to squeeze a stress ball.  Be active in a useful way.     Exercise can burn off extra energy, relieve stress, boost your mood and calm your mind.    Meditation, yoga or summer chi can help you better control your attention and impulses.  Stay healthy.     Get enough sleep.    Avoid caffeine late in the day.    Exercise.    Create a relaxing bedtime routine.    Stick to a schedule or daily routine.    Eat small meals throughout the day.    Avoid sugar, eat fewer carbohydrates and eat more protein.  Close relationships  Talk to your loved ones about ADHD.     There are many resources to  "help your loved one understand ADHD. See the Resources section on the next page.  Divide daily tasks based on each person's strengths.     For example, if you have trouble with organization, you may not want to do financial planning tasks.  If you have trouble with focus, develop a sign that others in your life can use as a gentle reminder to pay attention.    The sign should be small but meaningful to you and the other person.  Improve communication.     Use a dry erase board in a common area to help the whole family stay in touch better.    Keep regular to-do lists and compare scheduled activities every day.    Writing notes to each other is also very helpful.  Be an active listener.     Try not to interrupt.    If you find your mind wandering when others are talking, mentally repeat their words so you can follow the conversation.    Ask questions and ask people to repeat what they said if needed.    Pay close attention to body language.  Organize your thoughts.     If you need to have a serious conversation, write a list of the points you would like to make or the important ideas you want to talk about. This can help you stay focused and remember what you need to say.  Think before speaking.     It can be hard to control your impulses when you feel strongly about something.    Before saying whatever pops into your head, stop to ask yourself \"Will this be helpful?\" or \"Will this help me get what I want?\"  Take charge of your life.     Work to accept that some things are harder for you.    Make a plan to address these troubles and seek the support you need.  Resources  Online    ADD Warehouse. www.addwarehouse.com    ADHD and Marriage. www.adhdmarriage.com    ADDitude. www.additudemag.com    Attention Deficit Disorder Association.   www.add.org    Children and Adults with Attention-Deficit/Hyperactivity Disorder (IZA). www.iza.org    Ruth Ortiz. \"33 ADHD Friendly Ways to Get Organized with Adult ADHD.\" " "www.DescribeMeg.com/adhd/article/729.html    Chris Bliss and Rosa Brian. \"ADHD in Adults.\" www.helpguide.org/mental/adhd_add_adult_strategies.htmYou can also find many apps for your phone that can help you with common ADHD struggles.  Books    Luis Enrique Morris. ADHD in Adults. (2008).    Luis Enrique Morris. Attention-Deficit Hyperactivity Disorder: A Handbook for Diagnosis and Treatment. (2015).    Luis Enrique Morris. Taking Charge of Adult ADHD. (2010).    Anthony Turpin and Ward Ames. Delivered from Distraction. (2006).    Anthony Turpin and Ward Ames. Driven to Distraction. (2011).    Rosalia Noe, Claire James, et al. You Mean I'm Not Lazy, Stupid or Crazy?!: The Classic Self-Help Book for Adults with Attention Deficit Disorder. (2006).    Cordelia Brennan. ADD in the Workplace. (1997).    Cordelia Brennan and Ruth Ortiz. ADD-Friendly Ways to Organize Your Life. (2016).    Cordelia Brennan, Alejandrina Gooden, et al. Understanding Girls with ADHD: How They Feel and Why They Do What They Do. (2015).    Gabriela Layne. The ADHD Effect on Marriage: Understand and Rebuild Your Relationship in Six Steps. (2010).    Gabriela Layne and Fanny Mora. The Couple's Guide to Thriving with ADHD. (2014.)  ADHD Support groups    [address]  Visit www.[facility].Patient [employer]versation Media/services/support-groups/adhd-adult-support-group-thursday-eveningsfor information about dates and times, or e-mail info@[facility].Patient Conversation Media    add.org/adhd-support-groups&#047;(online group)  For informational purposes only. Not to replace the advice of your health care provider. Copyright   2014 [employer]. All rights reserved. Clinically reviewed by [facility]. [employer] [id number] - REV 01/20.           "

## 2021-10-19 NOTE — PROGRESS NOTES
Jose Maria is a 21 year old who is being evaluated via a billable video visit.      How would you like to obtain your AVS? MyChart  If the video visit is dropped, the invitation should be resent by: Text to cell phone: [phone]  Will anyone else be joining your video visit? No      Video Start Time: 2:36 PM    Assessment & Plan     Attention deficit hyperactivity disorder (ADHD), combined type  Still suboptimal control of symptoms in spite of managing anxiety and adjusting atomoxetine dose.  Discussed with patient collaborative psychiatry referral for diagnostic clarification and further management.  Keep atomoxetine dose for now until seen by psychiatry.  Patient agrees to plan.  Handout for ADHD given on AVS.  Return precautions discussed and given to patient.    - MENTAL HEALTH REFERRAL  - Adult; Psychiatry; Psychiatry; Collaborative Care Psychiatry Service/Bridge to Long-Term Psychiatry as indicated ([phone]); Yes; Chronic Mental Health without improvement; Yes; We will contact you to schedule the a...      Patient Instructions   You concurred with referral to collaborative psychiatry - expet a call from the  in the next few business days.  If no call by Monday next week, call the referral number.    It looks like you had a refill of strattera sent last week. For 90 days.    Keep taking this for now.      Patient Education     Coping with Attention Deficit Hyperactivity Disorder (ADHD)  Helpful Tips for Adults  If you have ADHD, it can be hard to sit still, pay attention or control impulsive behavior. ADHD can cause problems in many areas of your life. But you can learn ways to control your symptoms. Below are some ideas for coping with the most common ADHD problems.   Memory, focus and managing time  Be mindful of time.     Use a watch, phone, timer or other device that keeps correct time. Be sure you can see and hear it at all times.    Set more than one alarm to remind you how much time you  have left for a task.    Give yourself more time than you think you need.    For important appointments or deadlines, set reminder alarms hours or days ahead of time.  Use a day planner.     A day planner can help you manage time and remember responsibilities.    Learning to use a planner is just like learning to use any tool. Practice makes perfect.  Use lists.     Lists and notes can help you keep track of regular tasks, projects, deadlines and appointments.    If you decide to use a daily planner, keep all lists and notes inside of it. Use color codes for tasks so you know which ones are the most important.  Learn to say no and set boundaries.     Do not take on too many projects or social plans.    Overbooking yourself can be overwhelming and can lead to missed commitments.  Repeat out loud instructions you have been given.     This will help you remember, as well as let others correct you if needed.  Organization  Create space.     Ask yourself what you need on a daily basis. Then, find storage bins or closets for things you don't need every day.    Have specific areas for things like keys, bills and other items that are easy to misplace.    Throw away or donate things you don't need.  Deal with it now.     You can avoid forgetfulness and clutter by doing things right away, not some time in the future.    This includes filing papers, cleaning up messes, opening and responding to mail and returning phone calls.  Set up a filing system.     Use dividers or separate file folders for different types of documents, such as medical records, receipts and pay stubs.    Label and color-code your files so that you can quickly find what you need.  Break larger tasks into small, manageable pieces.     Write down the steps needed to finish the task. Follow each step in order until the task is done.    If needed, take small, timed breaks and return to finish the task.  Organize your work space.     Use lists or planners to  organize your day.    Remove clutter from your desk.    Label any storage bins.    Reduce distraction as much as possible. Ideas include sitting facing the wall, closing your door or using a white noise machine.  Sitting still  Move around as needed.     Don't fight the urge to move around. If you need to fidget or stand up for a period of time to help you pay attention, then do so. But take care that you're not interrupting others.    You may also find it helpful to squeeze a stress ball.  Be active in a useful way.     Exercise can burn off extra energy, relieve stress, boost your mood and calm your mind.    Meditation, yoga or summer chi can help you better control your attention and impulses.  Stay healthy.     Get enough sleep.    Avoid caffeine late in the day.    Exercise.    Create a relaxing bedtime routine.    Stick to a schedule or daily routine.    Eat small meals throughout the day.    Avoid sugar, eat fewer carbohydrates and eat more protein.  Close relationships  Talk to your loved ones about ADHD.     There are many resources to help your loved one understand ADHD. See the Resources section on the next page.  Divide daily tasks based on each person's strengths.     For example, if you have trouble with organization, you may not want to do financial planning tasks.  If you have trouble with focus, develop a sign that others in your life can use as a gentle reminder to pay attention.    The sign should be small but meaningful to you and the other person.  Improve communication.     Use a dry erase board in a common area to help the whole family stay in touch better.    Keep regular to-do lists and compare scheduled activities every day.    Writing notes to each other is also very helpful.  Be an active listener.     Try not to interrupt.    If you find your mind wandering when others are talking, mentally repeat their words so you can follow the conversation.    Ask questions and ask people to repeat what  "they said if needed.    Pay close attention to body language.  Organize your thoughts.     If you need to have a serious conversation, write a list of the points you would like to make or the important ideas you want to talk about. This can help you stay focused and remember what you need to say.  Think before speaking.     It can be hard to control your impulses when you feel strongly about something.    Before saying whatever pops into your head, stop to ask yourself \"Will this be helpful?\" or \"Will this help me get what I want?\"  Take charge of your life.     Work to accept that some things are harder for you.    Make a plan to address these troubles and seek the support you need.  Resources  Online    ADD Warehouse. www.What They Like.[employer]    ADHD and Marriage. www.[employer]og[employer] Concepts    ADDitude. www.additudemag.com    Attention Deficit Disorder Association.   www.add.org    Children and Adults with Attention-Deficit/Hyperactivity Disorder (NENA). www.nena.org    Ruth Ortiz. \"33 ADHD Friendly Ways to Get Organized with Adult ADHD.\" www.Videdressing.com/adhd/article/729.html    Chris Bliss and Rosa Brian. \"ADHD in Adults.\" www.helpguide.org/mental/adhd_add_adult_strategies.htmYou can also find many apps for your phone that can help you with common ADHD struggles.  Books    Luis Enrique Morris. ADHD in Adults. (2008).    Luis Enrique Morris. Attention-Deficit Hyperactivity Disorder: A Handbook for Diagnosis and Treatment. (2015).    Luis Enrique Morris. Taking Charge of Adult ADHD. (2010).    Anthony Ames. Delivered from Distraction. (2006).    Anthony Ames. Driven to Distraction. (2011).    Claire Reilly, et al. You Mean I'm Not Lazy, Stupid or Crazy?!: The Classic Self-Help Book for Adults with Attention Deficit Disorder. (2006).    Cordelia Brennan. ADD in the Workplace. (1997).    Cordelia Brennan and Ruth Ortiz. ADD-Friendly Ways to Organize Your Life. " (2016).    Alejandrina Riggs et al. Understanding Girls with ADHD: How They Feel and Why They Do What They Do. (2015).    Gabriela Layne. The ADHD Effect on Marriage: Understand and Rebuild Your Relationship in Six Steps. (2010).    Gabriela Layne and Fanny Mora. The Couple's Guide to Thriving with ADHD. (2014.)  ADHD Support groups    [address], [place]  Visit www.[employer]/services/support-groups/adhd-adult-support-group-thursday-eveningsfor information about dates and times, or e-mail info@[employer].org/adhd-support-groups&#047;(online group)  For informational purposes only. Not to replace the advice of your health care provider. Copyright   2014 [facility]. All rights reserved. Clinically reviewed by [facility]. [employer] [id number] - REV 01/20.               Return in about 3 months (around 1/19/2022) for Virtual visit for follow up in ADD.    Sander Morris MD  [facility] RUSLAN Moise is a 21 year old who presents for the following health issues     HPI   Chief Complaint   Patient presents with     A.D.H.D     follow up         Medication Followup of Strattera    Taking Medication as prescribed: yes    Side Effects:  None    Medication Helping Symptoms:  NO-patient does not feel the Strattera has helped at all.    Patient states still with difficulty with focusing and staying on task. Still has wandering thoughts intermittently.  Reports anxiety symptoms are well controlled though.    Patient [place] right now.    Is taking sertraline for anxiety.  Denies new symptoms.      Review of Systems   C: NEGATIVE for fever, chills, change in weight  I: NEGATIVE for worrisome rashes, moles or lesions  E: NEGATIVE for vision changes or irritation  E/M: NEGATIVE for ear, mouth and throat problems  R: NEGATIVE for significant cough or  SOB  CV: NEGATIVE for chest pain, palpitations or peripheral edema  GI: NEGATIVE for nausea, abdominal pain, heartburn, or change in bowel habits  : NEGATIVE for frequency, dysuria, or hematuria  N: NEGATIVE for weakness, dizziness or paresthesias  E: NEGATIVE for temperature intolerance, skin/hair changes  PSYCHIATRIC: see above      Objective           Vitals:  No vitals were obtained today due to virtual visit.    Physical Exam   GENERAL: Healthy, alert and no distress  EYES: Eyes grossly normal to inspection.  No discharge or erythema, or obvious scleral/conjunctival abnormalities.  RESP: No audible wheeze, cough, or visible cyanosis.  No visible retractions or increased work of breathing.    SKIN: Visible skin clear. No significant rash, abnormal pigmentation or lesions.  NEURO: Cranial nerves grossly intact.  Mentation and speech appropriate for age.  PSYCH: Mentation appears normal, affect normal/bright, judgement and insight intact, normal speech and appearance well-groomed.    No results found for any visits on 10/19/21.        Video-Visit Details    Type of service:  Video Visit    Video End Time:2:48 PM    Originating Location (pt. Location): Home    Distant Location (provider location):  [facility]     Platform used for Video Visit: [employer]

## 2021-12-06 ENCOUNTER — MYC MEDICAL ADVICE (OUTPATIENT)
Dept: FAMILY MEDICINE | Facility: CLINIC | Age: 21
End: 2021-12-06
Payer: COMMERCIAL

## 2021-12-18 ENCOUNTER — TRANSFERRED RECORDS (OUTPATIENT)
Dept: HEALTH INFORMATION MANAGEMENT | Facility: CLINIC | Age: 21
End: 2021-12-18
Payer: COMMERCIAL

## 2021-12-18 LAB — EJECTION FRACTION: 78 %

## 2021-12-30 ENCOUNTER — TRANSFERRED RECORDS (OUTPATIENT)
Dept: HEALTH INFORMATION MANAGEMENT | Facility: CLINIC | Age: 21
End: 2021-12-30
Payer: COMMERCIAL

## 2022-01-02 ENCOUNTER — HEALTH MAINTENANCE LETTER (OUTPATIENT)
Age: 22
End: 2022-01-02

## 2022-06-28 ENCOUNTER — TRANSFERRED RECORDS (OUTPATIENT)
Dept: FAMILY MEDICINE | Facility: CLINIC | Age: 22
End: 2022-06-28

## 2022-12-08 DIAGNOSIS — F41.9 ANXIETY: ICD-10-CM

## 2022-12-12 ENCOUNTER — MYC MEDICAL ADVICE (OUTPATIENT)
Dept: FAMILY MEDICINE | Facility: CLINIC | Age: 22
End: 2022-12-12

## 2023-01-11 ENCOUNTER — MYC REFILL (OUTPATIENT)
Dept: FAMILY MEDICINE | Facility: CLINIC | Age: 23
End: 2023-01-11

## 2023-01-11 DIAGNOSIS — F41.9 ANXIETY: ICD-10-CM

## 2023-01-13 ENCOUNTER — MYC MEDICAL ADVICE (OUTPATIENT)
Dept: FAMILY MEDICINE | Facility: CLINIC | Age: 23
End: 2023-01-13

## 2023-01-13 DIAGNOSIS — F41.9 ANXIETY: ICD-10-CM

## 2023-01-13 NOTE — TELEPHONE ENCOUNTER
Medication Question or Refill        What medication are you calling about (include dose and sig)?: sertraline (ZOLOFT) 50 MG TABLET    Controlled Substance Agreement on file:   CSA -- Patient Level:    CSA: None found at the patient level.       Who prescribed the medication?: BENJI    Do you need a refill? Yes:     When did you use the medication last? TODAY    Patient offered an appointment? Yes: SCHEDULED FOR 1/30 WITH     Do you have any questions or concerns?  Yes: I am out of my ZOLOFT and need to have this refilled today please. I have an appt with  upcoming on the 30th of this month. Please dilip enough meds to get to this appt. ( he has been out of the office)    Preferred Pharmacy:   [facility] [address] [id number] [facility]  [address] [facility] [address]  Phone: [phone] Fax: [phone]      Could we send this information to you in [facility] or would you prefer to receive a phone call?:   Patient would prefer a phone call   Okay to leave a detailed message?: Yes at Home number on file [phone] (home)

## 2023-01-30 ENCOUNTER — OFFICE VISIT (OUTPATIENT)
Dept: FAMILY MEDICINE | Facility: CLINIC | Age: 23
End: 2023-01-30
Payer: COMMERCIAL

## 2023-01-30 VITALS
WEIGHT: 168.2 LBS | SYSTOLIC BLOOD PRESSURE: 110 MMHG | OXYGEN SATURATION: 98 % | DIASTOLIC BLOOD PRESSURE: 60 MMHG | TEMPERATURE: 98.6 F | HEIGHT: 68 IN | HEART RATE: 80 BPM | BODY MASS INDEX: 25.49 KG/M2 | RESPIRATION RATE: 16 BRPM

## 2023-01-30 DIAGNOSIS — F41.9 ANXIETY: Primary | ICD-10-CM

## 2023-01-30 DIAGNOSIS — F43.22 ADJUSTMENT DISORDER WITH ANXIOUS MOOD: ICD-10-CM

## 2023-01-30 DIAGNOSIS — Z11.4 SCREENING FOR HIV (HUMAN IMMUNODEFICIENCY VIRUS): ICD-10-CM

## 2023-01-30 DIAGNOSIS — Z11.59 NEED FOR HEPATITIS C SCREENING TEST: ICD-10-CM

## 2023-01-30 PROCEDURE — 99214 OFFICE O/P EST MOD 30 MIN: CPT | Performed by: FAMILY MEDICINE

## 2023-01-30 ASSESSMENT — PATIENT HEALTH QUESTIONNAIRE - PHQ9
5. POOR APPETITE OR OVEREATING: NOT AT ALL
SUM OF ALL RESPONSES TO PHQ QUESTIONS 1-9: 10

## 2023-01-30 ASSESSMENT — ANXIETY QUESTIONNAIRES
GAD7 TOTAL SCORE: 7
2. NOT BEING ABLE TO STOP OR CONTROL WORRYING: MORE THAN HALF THE DAYS
GAD7 TOTAL SCORE: 7
5. BEING SO RESTLESS THAT IT IS HARD TO SIT STILL: MORE THAN HALF THE DAYS
3. WORRYING TOO MUCH ABOUT DIFFERENT THINGS: SEVERAL DAYS
IF YOU CHECKED OFF ANY PROBLEMS ON THIS QUESTIONNAIRE, HOW DIFFICULT HAVE THESE PROBLEMS MADE IT FOR YOU TO DO YOUR WORK, TAKE CARE OF THINGS AT HOME, OR GET ALONG WITH OTHER PEOPLE: NOT DIFFICULT AT ALL
1. FEELING NERVOUS, ANXIOUS, OR ON EDGE: SEVERAL DAYS
6. BECOMING EASILY ANNOYED OR IRRITABLE: NOT AT ALL
7. FEELING AFRAID AS IF SOMETHING AWFUL MIGHT HAPPEN: SEVERAL DAYS

## 2023-01-30 ASSESSMENT — ENCOUNTER SYMPTOMS: NERVOUS/ANXIOUS: 1

## 2023-01-30 NOTE — PATIENT INSTRUCTIONS
Keep taking sertraline 50 mg daily.  Referral to mental health has been signed. Schedulers will call you in the next 3-5 business days.     See provider again if with any new concerns.    Consider screening for hepatitis C and HIV.    Complete your covid-19 booster to protect you from severe illness.    Consider getting HPV vaccine series to protect you from high risk genital wart strains.

## 2023-01-30 NOTE — PROGRESS NOTES
Assessment & Plan     Anxiety  Uncontrolled due to recent life event. However, patient does not want to increase sertraline dose.  He did request referral for CBT. Hence, order placed. Advised may have long wait time for an appointment.  Discussed with patient that frustrated and sad feelings from his recent school event can be expected and not unusual. He does not exhibit erratic behavior or thought process.  Advised patient while it is natural to feel sad about the event, he should try to also focus on thinking of what he can do going forward.  Encouraged healthy habits.  Advised to seek care promptly if with worsening symptoms.  Return precautions discussed and given to patient.   - sertraline (ZOLOFT) 50 MG tablet  Dispense: 90 tablet; Refill: 1  - Adult Mental Health  Referral    Adjustment disorder with anxious mood  See above  - Adult Mental Health  Referral    Screening for HIV (human immunodeficiency virus)  Need for hepatitis C screening test  Patient was advised of recommendations. He deferred.      Depression Screening Follow Up    PHQ 1/30/2023   PHQ-9 Total Score 10   Q9: Thoughts of better off dead/self-harm past 2 weeks Not at all     Last PHQ-9 1/30/2023   1.  Little interest or pleasure in doing things 2   2.  Feeling down, depressed, or hopeless 1   3.  Trouble falling or staying asleep, or sleeping too much 0   4.  Feeling tired or having little energy 2   5.  Poor appetite or overeating 0   6.  Feeling bad about yourself 3   7.  Trouble concentrating 2   8.  Moving slowly or restless 0   Q9: Thoughts of better off dead/self-harm past 2 weeks 0   PHQ-9 Total Score 10   Difficulty at work, home, or with people Not difficult at all       Follow Up Actions Taken  Mental Health Referral placed  Follow up recommended: 1 month if worsening     Patient Instructions   Keep taking sertraline 50 mg daily.  Referral to mental health has been signed. Schedulers will call you in the next  3-5 business days.     See provider again if with any new concerns.    Consider screening for hepatitis C and HIV.    Complete your covid-19 booster to protect you from severe illness.    Consider getting HPV vaccine series to protect you from high risk genital wart strains.          Return in about 6 months (around 7/30/2023) for Virtual visit for follow up on anxiety.    Sander Morris MD  [facility] RUSLAN Moise is a 22 year old, presenting for the following health issues:  Anxiety (Follow up)      Anxiety    History of Present Illness       Reason for visit:  Review medication and discuss mood/attention    He eats 4 or more servings of fruits and vegetables daily.He consumes 1 sweetened beverage(s) daily.He exercises with enough effort to increase his heart rate 60 or more minutes per day.  He exercises with enough effort to increase his heart rate 6 days per week.   He is taking medications regularly.       Anxiety Follow-Up    How are you doing with your anxiety since your last visit? Worsened some    Are you having other symptoms that might be associated with anxiety? Yes:  no motivation, trouble staying focused , maintaining positive mood    Have you had a significant life event? 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 Yes:  situational    Do you have any concerns with your use of alcohol or other drugs? No    Social History     Tobacco Use     Smoking status: Never     Smokeless tobacco: Never   Vaping Use     Vaping Use: Every day     Substances: Nicotine, Flavoring     Devices: Disposable   Substance Use Topics     Alcohol use: Yes     Comment: 12 beers per weekend     Drug use: Yes     Types: Marijuana     KATHRYN-7 SCORE 1/4/2021 6/3/2021 1/30/2023   Total Score 3 5 7     PHQ 1/4/2021 6/3/2021 1/30/2023   PHQ-9 Total Score 4 3 10   Q9: Thoughts of better off dead/self-harm past 2 weeks Not at all Not at all Not at  "all     Last PHQ-9 1/30/2023   1.  Little interest or pleasure in doing things 2   2.  Feeling down, depressed, or hopeless 1   3.  Trouble falling or staying asleep, or sleeping too much 0   4.  Feeling tired or having little energy 2   5.  Poor appetite or overeating 0   6.  Feeling bad about yourself 3   7.  Trouble concentrating 2   8.  Moving slowly or restless 0   Q9: Thoughts of better off dead/self-harm past 2 weeks 0   PHQ-9 Total Score 10   Difficulty at work, home, or with people Not difficult at all     KATHRYN-7  1/30/2023   1. Feeling nervous, anxious, or on edge 1   2. Not being able to stop or control worrying 2   3. Worrying too much about different things 1   4. Trouble relaxing 0   5. Being so restless that it is hard to sit still 2   6. Becoming easily annoyed or irritable 0   7. Feeling afraid, as if something awful might happen 1   KATHRYN-7 Total Score 7   If you checked any problems, how difficult have they made it for you to do your work, take care of things at home, or get along with other people? Not difficult at all           Review of Systems   Psychiatric/Behavioral: The patient is nervous/anxious.       Constitutional, HEENT, cardiovascular, pulmonary, GI, , musculoskeletal, neuro, skin, endocrine and psych systems are negative, except as otherwise noted.      Objective    /60   Pulse 80   Temp 98.6  F (37  C) (Tympanic)   Resp 16   Ht 1.721 m (5' 7.75\")   Wt 76.3 kg (168 lb 3.2 oz)   SpO2 98%   BMI 25.76 kg/m    Body mass index is 25.76 kg/m .  Physical Exam   GENERAL:  alert and no distress  EYES: no icterus, PERRLA  SKIN: no jaundice/rash  NEURO: no tremors  PSYCH: well-kempt, linear thought process, normal speech, good insight/judgement, anxious and depressed mood, tearful affect at portions of the encounter, no suicidality, no aggression, no hallucination    No results found for any visits on 01/30/23.                "

## 2023-04-09 ENCOUNTER — HEALTH MAINTENANCE LETTER (OUTPATIENT)
Age: 23
End: 2023-04-09

## 2023-04-11 ENCOUNTER — TRANSFERRED RECORDS (OUTPATIENT)
Dept: HEALTH INFORMATION MANAGEMENT | Facility: CLINIC | Age: 23
End: 2023-04-11
Payer: COMMERCIAL

## 2023-08-03 DIAGNOSIS — F41.9 ANXIETY: ICD-10-CM

## 2023-11-03 DIAGNOSIS — F41.9 ANXIETY: ICD-10-CM

## 2024-02-04 DIAGNOSIS — F41.9 ANXIETY: ICD-10-CM

## 2024-05-06 DIAGNOSIS — F41.9 ANXIETY: ICD-10-CM

## 2024-06-15 ENCOUNTER — HEALTH MAINTENANCE LETTER (OUTPATIENT)
Age: 24
End: 2024-06-15

## 2024-07-01 ENCOUNTER — TELEPHONE (OUTPATIENT)
Dept: FAMILY MEDICINE | Facility: CLINIC | Age: 24
End: 2024-07-01
Payer: COMMERCIAL

## 2024-07-01 DIAGNOSIS — I37.9 PULMONARY VALVE DISORDER: Primary | ICD-10-CM

## 2024-07-01 DIAGNOSIS — Z95.2 HISTORY OF PROSTHETIC PULMONIC VALVE REPLACEMENT: ICD-10-CM

## 2024-07-01 RX ORDER — AMOXICILLIN 500 MG/1
2000 CAPSULE ORAL ONCE
Qty: 4 CAPSULE | Refills: 0 | Status: SHIPPED | OUTPATIENT
Start: 2024-07-01 | End: 2024-07-01

## 2024-07-01 NOTE — TELEPHONE ENCOUNTER
Patient's call transferred to author    Patient requesting an antibiotic be sent to [facility] in [place] as he had a heart valve replacement in 2022   Patient was advised by his Cardiologist to take an antibiotic prior to any dental cleaning or procedures     Patient has an upcoming dental appointment and needs a prescription for an antibiotic to be taken before and after the dental cleaning       Patient overdue for annual exam  Scheduled for 7/2/2024 at 1510 with Dr. Morris    Patient verbalized understanding  No further questions/concerns    Zander Pascal, Clinic RN  [facility]

## 2024-07-02 ENCOUNTER — OFFICE VISIT (OUTPATIENT)
Dept: FAMILY MEDICINE | Facility: CLINIC | Age: 24
End: 2024-07-02
Payer: COMMERCIAL

## 2024-07-02 VITALS
HEART RATE: 65 BPM | BODY MASS INDEX: 26.37 KG/M2 | RESPIRATION RATE: 24 BRPM | DIASTOLIC BLOOD PRESSURE: 56 MMHG | HEIGHT: 68 IN | TEMPERATURE: 98 F | SYSTOLIC BLOOD PRESSURE: 118 MMHG | WEIGHT: 174 LBS | OXYGEN SATURATION: 98 %

## 2024-07-02 DIAGNOSIS — Z00.00 ROUTINE GENERAL MEDICAL EXAMINATION AT A HEALTH CARE FACILITY: Primary | ICD-10-CM

## 2024-07-02 DIAGNOSIS — Z11.3 ROUTINE SCREENING FOR STI (SEXUALLY TRANSMITTED INFECTION): ICD-10-CM

## 2024-07-02 DIAGNOSIS — Z11.59 NEED FOR HEPATITIS C SCREENING TEST: ICD-10-CM

## 2024-07-02 DIAGNOSIS — Z11.4 SCREENING FOR HIV (HUMAN IMMUNODEFICIENCY VIRUS): ICD-10-CM

## 2024-07-02 DIAGNOSIS — F41.9 ANXIETY: ICD-10-CM

## 2024-07-02 PROCEDURE — 86780 TREPONEMA PALLIDUM: CPT | Performed by: FAMILY MEDICINE

## 2024-07-02 PROCEDURE — 87491 CHLMYD TRACH DNA AMP PROBE: CPT | Performed by: FAMILY MEDICINE

## 2024-07-02 PROCEDURE — 36415 COLL VENOUS BLD VENIPUNCTURE: CPT | Performed by: FAMILY MEDICINE

## 2024-07-02 PROCEDURE — 99395 PREV VISIT EST AGE 18-39: CPT | Mod: 25 | Performed by: FAMILY MEDICINE

## 2024-07-02 PROCEDURE — 90677 PCV20 VACCINE IM: CPT | Performed by: FAMILY MEDICINE

## 2024-07-02 PROCEDURE — 87389 HIV-1 AG W/HIV-1&-2 AB AG IA: CPT | Performed by: FAMILY MEDICINE

## 2024-07-02 PROCEDURE — 90472 IMMUNIZATION ADMIN EACH ADD: CPT | Performed by: FAMILY MEDICINE

## 2024-07-02 PROCEDURE — 86803 HEPATITIS C AB TEST: CPT | Performed by: FAMILY MEDICINE

## 2024-07-02 PROCEDURE — 87591 N.GONORRHOEAE DNA AMP PROB: CPT | Performed by: FAMILY MEDICINE

## 2024-07-02 PROCEDURE — 99213 OFFICE O/P EST LOW 20 MIN: CPT | Mod: 25 | Performed by: FAMILY MEDICINE

## 2024-07-02 PROCEDURE — 90651 9VHPV VACCINE 2/3 DOSE IM: CPT | Performed by: FAMILY MEDICINE

## 2024-07-02 PROCEDURE — 90471 IMMUNIZATION ADMIN: CPT | Performed by: FAMILY MEDICINE

## 2024-07-02 PROCEDURE — 96127 BRIEF EMOTIONAL/BEHAV ASSMT: CPT | Performed by: FAMILY MEDICINE

## 2024-07-02 SDOH — HEALTH STABILITY: PHYSICAL HEALTH: ON AVERAGE, HOW MANY DAYS PER WEEK DO YOU ENGAGE IN MODERATE TO STRENUOUS EXERCISE (LIKE A BRISK WALK)?: 6 DAYS

## 2024-07-02 ASSESSMENT — PATIENT HEALTH QUESTIONNAIRE - PHQ9
5. POOR APPETITE OR OVEREATING: SEVERAL DAYS
SUM OF ALL RESPONSES TO PHQ QUESTIONS 1-9: 0

## 2024-07-02 ASSESSMENT — ANXIETY QUESTIONNAIRES
7. FEELING AFRAID AS IF SOMETHING AWFUL MIGHT HAPPEN: SEVERAL DAYS
1. FEELING NERVOUS, ANXIOUS, OR ON EDGE: SEVERAL DAYS
6. BECOMING EASILY ANNOYED OR IRRITABLE: NOT AT ALL
IF YOU CHECKED OFF ANY PROBLEMS ON THIS QUESTIONNAIRE, HOW DIFFICULT HAVE THESE PROBLEMS MADE IT FOR YOU TO DO YOUR WORK, TAKE CARE OF THINGS AT HOME, OR GET ALONG WITH OTHER PEOPLE: NOT DIFFICULT AT ALL
5. BEING SO RESTLESS THAT IT IS HARD TO SIT STILL: NOT AT ALL
GAD7 TOTAL SCORE: 3
GAD7 TOTAL SCORE: 3
3. WORRYING TOO MUCH ABOUT DIFFERENT THINGS: NOT AT ALL
2. NOT BEING ABLE TO STOP OR CONTROL WORRYING: NOT AT ALL

## 2024-07-02 ASSESSMENT — SOCIAL DETERMINANTS OF HEALTH (SDOH): HOW OFTEN DO YOU GET TOGETHER WITH FRIENDS OR RELATIVES?: MORE THAN THREE TIMES A WEEK

## 2024-07-02 ASSESSMENT — ENCOUNTER SYMPTOMS: NERVOUS/ANXIOUS: 1

## 2024-07-02 ASSESSMENT — PAIN SCALES - GENERAL: PAINLEVEL: NO PAIN (0)

## 2024-07-02 NOTE — PROGRESS NOTES
"Preventive Care Visit  [facility]  Sander Morris MD, Family Medicine  Jul 2, 2024      Assessment & Plan     Routine general medical examination at a health care facility  Patient was advised on recommended screening and preventive health recommendations.  He verbalized understanding and agreed to the plans below.   He deferred covid19 vaccine to another day.    Anxiety  Stable.  Reinforced non-medical means to manage and cope with stress.  Keep active; exercise regularly.  Return precautions discussed and given to patient.   - sertraline (ZOLOFT) 50 MG tablet  Dispense: 90 tablet; Refill: 3  - OFFICE/OUTPT VISIT,EST,LEVL III    Screening for HIV (human immunodeficiency virus)    Need for hepatitis C screening test    Routine screening for STI (sexually transmitted infection)  - Hepatitis C Screen Reflex to HCV RNA Quant and Genotype  - HIV Antigen Antibody Combo Cascade  - Chlamydia trachomatis/Neisseria gonorrhoeae by PCR  - Treponema Abs w Reflex to RPR and Titer  - Treponema Abs w Reflex to RPR and Titer  - Chlamydia trachomatis/Neisseria gonorrhoeae by PCR  - HIV Antigen Antibody Combo Cascade  - Hepatitis C Screen Reflex to HCV RNA Quant and Genotype      Patient has been advised of split billing requirements and indicates understanding: Yes        BMI  Estimated body mass index is 26.46 kg/m  as calculated from the following:    Height as of this encounter: 1.727 m (5' 8\").    Weight as of this encounter: 78.9 kg (174 lb).   Weight management plan: Discussed healthy diet and exercise guidelines    Counseling  Appropriate preventive services were discussed with this patient, including applicable screening as appropriate for fall prevention, nutrition, physical activity, Tobacco-use cessation, weight loss and cognition.  Checklist reviewing preventive services available has been given to the patient.  Reviewed patient's diet, addressing concerns and/or questions.   The patient " "was instructed to see the dentist every 6 months.   He is at risk for psychosocial distress and has been provided with information to reduce risk.       Patient Instructions   Patient Education    You will be contacted in 1-2 days for results of your lab tests.   Further recommendations when results are out if necessary.    Continue sertraline.  If with worsening anxiety or moods, follow up in clinic.    Preventative Care Visits include: Yearly physicals, Well-child visits, Welcome to Medicare visits, & Medicare yearly wellness exams.    The purpose of these visits is to discuss your medical history and prevent health problems before you are sick.  You may need to pay a copay, coinsurance or deductible if your visit today includes testing or treating for a new or existing condition.    Additional charges may be incurred for today's visit. If you have questions about what your insurance plan covers, please contact your Insurance Company's member service department.  If you have questions specific to a bill you have already received from [email or web address], please contact the [employer] Billing Office at [phone].    Preventive Care Advice   This is general advice we often give to help people stay healthy. Your care team may have specific advice just for you. Please talk to your care team about your own preventive care needs.  Lifestyle  Exercise at least 150 minutes each week (30 minutes a day, 5 days a week).  Do muscle strengthening activities 2 days a week. These help control your weight and prevent disease.  No smoking.  Wear sunscreen to prevent skin cancer.  Have your home tested for radon every 2 to 5 years. Radon is a colorless, odorless gas that can harm your lungs. To learn more, go to www.health.[facility].[place].us and search for \"Radon in Homes.\"  Keep guns unloaded and locked up in a safe place like a safe or gun vault, or, use a gun lock and hide the keys. Always lock away bullets separately. To learn more, visit " "dps.[place].gov and search for \"safe gun storage.\"  Nutrition  Eat 5 or more servings of fruits and vegetables each day.  Try wheat bread, brown rice and whole grain pasta (instead of white bread, rice, and pasta).  Get enough calcium and vitamin D. Check the label on foods and aim for 100% of the RDA (recommended daily allowance).  Regular exams  Have a dental exam and cleaning every 6 months.  See your health care team every year to talk about:  Any changes in your health.  Any medicines your care team has prescribed.  Preventive care, family planning, and ways to prevent chronic diseases.  Shots (vaccines)   HPV shots (up to age 26), if you've never had them before.  Hepatitis B shots (up to age 59), if you've never had them before.  COVID-19 shot: Get this shot when it's due.  Flu shot: Get a flu shot every year.  Tetanus shot: Get a tetanus shot every 10 years.  Pneumococcal, hepatitis A, and RSV shots: Ask your care team if you need these based on your risk.  Shingles shot (for age 50 and up).  General health tests  Diabetes screening:  Starting at age 35, Get screened for diabetes at least every 3 years.  If you are younger than age 35, ask your care team if you should be screened for diabetes.  Cholesterol test: At age 39, start having a cholesterol test every 5 years, or more often if advised.  Bone density scan (DEXA): At age 50, ask your care team if you should have this scan for osteoporosis (brittle bones).  Hepatitis C: Get tested at least once in your life.  Abdominal aortic aneurysm screening: Talk to your doctor about having this screening if you:  Have ever smoked; and  Are biologically male; and  Are between the ages of 65 and 75.  STIs (sexually transmitted infections)  Before age 24: Ask your care team if you should be screened for STIs.  After age 24: Get screened for STIs if you're at risk. You are at risk for STIs (including HIV) if:  You are sexually active with more than one person.  You don't " use condoms every time.  You or a partner was diagnosed with a sexually transmitted infection.  If you are at risk for HIV, ask about PrEP medicine to prevent HIV.  Get tested for HIV at least once in your life, whether you are at risk for HIV or not.  Cancer screening tests  Cervical cancer screening: If you have a cervix, begin getting regular cervical cancer screening tests at age 21. Most people who have regular screenings with normal results can stop after age 65. Talk about this with your provider.  Breast cancer scan (mammogram): If you've ever had breasts, begin having regular mammograms starting at age 40. This is a scan to check for breast cancer.  Colon cancer screening: It is important to start screening for colon cancer at age 45.  Have a colonoscopy test every 10 years (or more often if you're at risk) Or, ask your provider about stool tests like a FIT test every year or Cologuard test every 3 years.  To learn more about your testing options, visit: www.[employer]/880774.pdf.  For help making a decision, visit: rupinder/rl54081.  Prostate cancer screening test: If you have a prostate and are age 55 to 69, ask your provider if you would benefit from a yearly prostate cancer screening test.  Lung cancer screening: If you are a current or former smoker age 50 to 80, ask your care team if ongoing lung cancer screenings are right for you.  For informational purposes only. Not to replace the advice of your health care provider. Copyright   2023 [facility]. All rights reserved. Clinically reviewed by the [facility] Transitions Program. [employer] 490305 - REV 04/24.  Substance Use Disorder: Care Instructions  Overview     You can improve your life and health by stopping your use of alcohol or drugs. When you don't drink or use drugs, you may feel and sleep better. You may get along better with your family, friends, and coworkers. There are medicines and programs that can help with  substance use disorder.  How can you care for yourself at home?  Here are some ways to help you stay sober and prevent relapse.  If you have been given medicine to help keep you sober or reduce your cravings, be sure to take it exactly as prescribed.  Talk to your doctor about programs that can help you stop using drugs or drinking alcohol.  Do not keep alcohol or drugs in your home.  Plan ahead. Think about what you'll say if other people ask you to drink or use drugs. Try not to spend time with people who drink or use drugs.  Use the time and money spent on drinking or drugs to do something that's important to you.  Preventing a relapse  Have a plan to deal with relapse. Learn to recognize changes in your thinking that lead you to drink or use drugs. Get help before you start to drink or use drugs again.  Try to stay away from situations, friends, or places that may lead you to drink or use drugs.  If you feel the need to drink alcohol or use drugs again, seek help right away. Call a trusted friend or family member. Some people get support from organizations such as Narcotics Anonymous or [facility] or from treatment facilities.  If you relapse, get help as soon as you can. Some people make a plan with another person that outlines what they want that person to do for them if they relapse. The plan usually includes how to handle the relapse and who to notify in case of relapse.  Don't give up. Remember that a relapse doesn't mean that you have failed. Use the experience to learn the triggers that lead you to drink or use drugs. Then quit again. Recovery is a lifelong process. Many people have several relapses before they are able to quit for good.  Follow-up care is a key part of your treatment and safety. Be sure to make and go to all appointments, and call your doctor if you are having problems. It's also a good idea to know your test results and keep a list of the medicines you take.  When should you call  "for help?   Call 911  anytime you think you may need emergency care. For example, call if you or someone else:    Has overdosed or has withdrawal signs. Be sure to tell the emergency workers that you are or someone else is using or trying to quit using drugs. Overdose or withdrawal signs may include:  Losing consciousness.  Seizure.  Seeing or hearing things that aren't there (hallucinations).     Is thinking or talking about suicide or harming others.   Where to get help 24 hours a day, 7 days a week   If you or someone you know talks about suicide, self-harm, a mental health crisis, a substance use crisis, or any other kind of emotional distress, get help right away. You can:    Call the Suicide and Crisis Lifeline at 988.     Call 5-945-260-TALK (1-923.699.9036).     Text HOME to 446599 to access the Crisis Text Line.   Consider saving these numbers in your phone.  Go to [employer] for more information or to chat online.  Call your doctor now or seek immediate medical care if:    You are having withdrawal symptoms. These may include nausea or vomiting, sweating, shakiness, and anxiety.   Watch closely for changes in your health, and be sure to contact your doctor if:    You have a relapse.     You need more help or support to stop.   Where can you learn more?  Go to https://www.[employer].net/patiented  Enter H573 in the search box to learn more about \"Substance Use Disorder: Care Instructions.\"  Current as of: November 15, 2023               Content Version: 14.0    4698-1164 [employer].   Care instructions adapted under license by your healthcare professional. If you have questions about a medical condition or this instruction, always ask your healthcare professional. [employer] disclaims any warranty or liability for your use of this information.      Safer Sex: Care Instructions  Overview  Safer sex is a way to reduce your risk of getting a sexually transmitted infection " (STI). It can also help prevent pregnancy.  Several products can help you practice safer sex and reduce your chance of STIs. One of the best is a condom. There are internal and external condoms. You can use a special rubber sheet (dental dam) for protection during oral sex. Disposable gloves can keep your hands from touching blood, semen, or other body fluids that can carry infections.  Remember that birth control methods such as diaphragms, IUDs, foams, and birth control pills do not stop you from getting STIs.  Follow-up care is a key part of your treatment and safety. Be sure to make and go to all appointments, and call your doctor if you are having problems. It's also a good idea to know your test results and keep a list of the medicines you take.  How can you care for yourself at home?  Think about getting vaccinated to help prevent hepatitis A, hepatitis B, and human papillomavirus (HPV). They can be spread through sex.  Use a condom every time you have sex. Use an external condom, which goes on the penis. Or use an internal condom, which goes into the vagina or anus.  Make sure you use the right size external condom. A condom that's too small can break easily. A condom that's too big can slip off during sex.  Use a new condom each time you have sex. Be careful not to poke a hole in the condom when you open the wrapper.  Don't use an internal condom and an external condom at the same time.  Never use petroleum jelly (such as Vaseline), grease, hand lotion, baby oil, or anything with oil in it. These products can make holes in the condom.  After intercourse, hold the edge of the condom as you remove it. This will help keep semen from spilling out of the condom.  Do not have sex with anyone who has symptoms of an STI, such as sores on the genitals or mouth.  Do not drink a lot of alcohol or use drugs before sex.  Limit your sex partners. Sex with one partner who has sex only with you can reduce your risk of  "getting an STI.  Don't share sex toys. But if you do share them, use a condom and clean the sex toys between each use.  Talk to any partners before you have sex. Talk about what you feel comfortable with and whether you have any boundaries with sex. And find out if your partner or partners may be at risk for any STI. Keep in mind that a person may be able to spread an STI even if they do not have symptoms. You and any partners may want to get tested for STIs.  Where can you learn more?  Go to https://www.[employer].net/patiented  Enter B608 in the search box to learn more about \"Safer Sex: Care Instructions.\"  Current as of: November 27, 2023               Content Version: 14.0    8119-8813 [employer].   Care instructions adapted under license by your healthcare professional. If you have questions about a medical condition or this instruction, always ask your healthcare professional. [employer] disclaims any warranty or liability for your use of this information.           Reva Moise is a 24 year old, presenting for the following:  Physical, Medication Request ( PT would like to request antibiotic for Dental check up due to heart infection risk.), Anxiety, and Depression        7/2/2024     3:28 PM   Additional Questions   Roomed by Antonia CHAVEZ MA   Accompanied by self         7/2/2024     3:28 PM   Patient Reported Additional Medications   Patient reports taking the following new medications none        Health Care Directive  Patient does not have a Health Care Directive or Living Will: Discussed advance care planning with patient; however, patient declined at this time.    Anxiety      Depression and Anxiety   How are you doing with your depression since your last visit? Improved   How are you doing with your anxiety since your last visit?  No change  Are you having other symptoms that might be associated with depression or anxiety? Yes:  \"mini panic attacks\" but is able to talk " self through it.   Have you had a significant life event? OTHER: Graduated college, starting new job, moving/selling childhood home, parent just remarried.    Do you have any concerns with your use of alcohol or other drugs? No  Patient is happy with dose of sertraline.    Patient needs abx ppx prior to dental procedures.       Social History     Tobacco Use    Smoking status: Never    Smokeless tobacco: Never   Vaping Use    Vaping status: Former    Substances: Nicotine, Flavoring    Devices: Disposable   Substance Use Topics    Alcohol use: Yes     Comment: 12 beers per weekend    Drug use: Yes     Types: Marijuana         6/3/2021     9:41 AM 1/30/2023     2:54 PM 7/2/2024     4:12 PM   PHQ   PHQ-9 Total Score 3 10 0   Q9: Thoughts of better off dead/self-harm past 2 weeks Not at all Not at all Not at all         6/3/2021     9:41 AM 1/30/2023     2:54 PM 7/2/2024     4:12 PM   KATHRYN-7 SCORE   Total Score 5 7 3         7/2/2024     4:12 PM   Last PHQ-9   1.  Little interest or pleasure in doing things 0   2.  Feeling down, depressed, or hopeless 0   3.  Trouble falling or staying asleep, or sleeping too much 0   4.  Feeling tired or having little energy 0   5.  Poor appetite or overeating 0   6.  Feeling bad about yourself 0   7.  Trouble concentrating 0   8.  Moving slowly or restless 0   Q9: Thoughts of better off dead/self-harm past 2 weeks 0   PHQ-9 Total Score 0   Difficulty at work, home, or with people Not difficult at all         7/2/2024     4:12 PM   KATHRYN-7    1. Feeling nervous, anxious, or on edge 1   2. Not being able to stop or control worrying 0   3. Worrying too much about different things 0   4. Trouble relaxing 1   5. Being so restless that it is hard to sit still 0   6. Becoming easily annoyed or irritable 0   7. Feeling afraid, as if something awful might happen 1   KATHRYN-7 Total Score 3   If you checked any problems, how difficult have they made it for you to do your work, take care of things at  home, or get along with other people? Not difficult at all       Suicide Assessment Five-step Evaluation and Treatment (SAFE-T)          7/2/2024   General Health   How would you rate your overall physical health? Excellent   Feel stress (tense, anxious, or unable to sleep) Only a little      (!) STRESS CONCERN      7/2/2024   Nutrition   Three or more servings of calcium each day? Yes   Diet: Regular (no restrictions)   How many servings of fruit and vegetables per day? 4 or more   How many sweetened beverages each day? 0-1            7/2/2024   Exercise   Days per week of moderate/strenous exercise 6 days            7/2/2024   Social Factors   Frequency of gathering with friends or relatives More than three times a week   Worry food won't last until get money to buy more No   Food not last or not have enough money for food? No   Do you have housing? (Housing is defined as stable permanent housing and does not include staying ouside in a car, in a tent, in an abandoned building, in an overnight shelter, or couch-surfing.) Yes   Are you worried about losing your housing? No   Lack of transportation? No   Unable to get utilities (heat,electricity)? No            7/2/2024   Dental   Dentist two times every year? (!) NO            7/2/2024   TB Screening   Were you born outside of the [place]? No            Today's PHQ-2 Score:       7/2/2024     3:09 PM   PHQ-2 ( 1999 Pfizer)   Q1: Little interest or pleasure in doing things 0   Q2: Feeling down, depressed or hopeless 0   PHQ-2 Score 0   Q1: Little interest or pleasure in doing things Not at all   Q2: Feeling down, depressed or hopeless Not at all   PHQ-2 Score 0           7/2/2024   Substance Use   Alcohol more than 3/day or more than 7/wk No   Do you use any other substances recreationally? (!) CANNABIS PRODUCTS        Social History     Tobacco Use    Smoking status: Never    Smokeless tobacco: Never   Vaping Use    Vaping status: Former    Substances: Nicotine,  Flavoring    Devices: Disposable   Substance Use Topics    Alcohol use: Yes     Comment: 12 beers per weekend    Drug use: Yes     Types: Marijuana             7/2/2024   One time HIV Screening   Previous HIV test? Yes          7/2/2024   STI Screening   New sexual partner(s) since last STI/HIV test? (!) YES             7/2/2024   Contraception/Family Planning   Questions about contraception or family planning No           Reviewed and updated as needed this visit by Provider     Meds                Past Medical History:   Diagnosis Date    Congenital stenosis of pulmonary valve 1999    Right Ventricular outflow obstruction by pulmonary valvotomy     Past Surgical History:   Procedure Laterality Date    CARDIAC SURGERY      FISSURECTOMY RECTUM N/A 7/12/2019    Procedure: EUA WITH  FISTULOTOMY , CURETTAGE OF FISTULA AND MARSUPIALIZATION;  Surgeon: Ruth Powers MD;  Location: [facility];  Service: General    SURGICAL HISTORY OF -   07/24/00    BMT    SURGICAL HISTORY OF - 06/02/99    Heart Surgery     Patient Active Problem List   Diagnosis    Pulmonary valve disorder    Eczema    Submandibular duct obstruction    History of chicken pox    Learning problem    Anxiety    Attention deficit hyperactivity disorder (ADHD), combined type    Marijuana use, episodic     Past Surgical History:   Procedure Laterality Date    CARDIAC SURGERY      FISSURECTOMY RECTUM N/A 7/12/2019    Procedure: EUA WITH  FISTULOTOMY , CURETTAGE OF FISTULA AND MARSUPIALIZATION;  Surgeon: Ruth Powers MD;  Location: [facility];  Service: General    SURGICAL HISTORY OF -   07/24/00    BMT    SURGICAL HISTORY OF - 06/02/99    Heart Surgery       Social History     Tobacco Use    Smoking status: Never    Smokeless tobacco: Never   Substance Use Topics    Alcohol use: Yes     Comment: 12 beers per weekend     Family History   Problem Relation Age of Onset    Cancer Paternal Grandmother          Current  "Outpatient Medications   Medication Sig Dispense Refill    sertraline (ZOLOFT) 50 MG tablet Take 1 tablet (50 mg) by mouth daily 90 tablet 3     No Known Allergies      Review of Systems  Constitutional, HEENT, cardiovascular, pulmonary, GI, , musculoskeletal, neuro, skin, endocrine and psych systems are negative, except as otherwise noted.     Objective    Exam  /56 (BP Location: Right arm, Patient Position: Sitting, Cuff Size: Adult Large)   Pulse 65   Temp 98  F (36.7  C) (Tympanic)   Resp 24   Ht 1.727 m (5' 8\")   Wt 78.9 kg (174 lb)   SpO2 98%   BMI 26.46 kg/m     Estimated body mass index is 26.46 kg/m  as calculated from the following:    Height as of this encounter: 1.727 m (5' 8\").    Weight as of this encounter: 78.9 kg (174 lb).    Physical Exam  GENERAL APPEARANCE:  alert and no distress  EYES: pink conj, no icterus, PERRL, EOMI  HENT: ear canals and TM's normal, nose and mouth without ulcers or lesions, oropharynx clear and oral mucous membranes moist  NECK: no adenopathy, no asymmetry, masses, or scars and thyroid normal to palpation  RESP: lungs clear to auscultation - no rales, rhonchi or wheezes  CV: regular rates and rhythm, normal S1 S2, no S3 or S4, no murmur, click or rub, no peripheral edema and peripheral pulses strong  ABDOMEN: soft, nontender, no hepatosplenomegaly, no masses and bowel sounds normal  RECTAL: deferred  MS: no musculoskeletal defects are noted and gait is age appropriate without ataxia  SKIN: no suspicious lesions or rashes  NEURO: Normal strength and tone, sensory exam grossly normal, mentation intact and speech normal         Signed Electronically by: Sander Morris MD    "

## 2024-07-02 NOTE — PATIENT INSTRUCTIONS
"Patient Education     You will be contacted in 1-2 days for results of your lab tests.   Further recommendations when results are out if necessary.    Continue sertraline.  If with worsening anxiety or moods, follow up in clinic.    Preventative Care Visits include: Yearly physicals, Well-child visits, Welcome to Medicare visits, & Medicare yearly wellness exams.    The purpose of these visits is to discuss your medical history and prevent health problems before you are sick.  You may need to pay a copay, coinsurance or deductible if your visit today includes testing or treating for a new or existing condition.    Additional charges may be incurred for today's visit. If you have questions about what your insurance plan covers, please contact your Insurance Company's member service department.  If you have questions specific to a bill you have already received from [employer], please contact the [facility] Billing Office at [phone].    Preventive Care Advice   This is general advice we often give to help people stay healthy. Your care team may have specific advice just for you. Please talk to your care team about your own preventive care needs.  Lifestyle  Exercise at least 150 minutes each week (30 minutes a day, 5 days a week).  Do muscle strengthening activities 2 days a week. These help control your weight and prevent disease.  No smoking.  Wear sunscreen to prevent skin cancer.  Have your home tested for radon every 2 to 5 years. Radon is a colorless, odorless gas that can harm your lungs. To learn more, go to www.health.[facility].mn. and search for \"Radon in Homes.\"  Keep guns unloaded and locked up in a safe place like a safe or gun vault, or, use a gun lock and hide the keys. Always lock away bullets separately. To learn more, visit [employer].gov and search for \"safe gun storage.\"  Nutrition  Eat 5 or more servings of fruits and vegetables each day.  Try wheat bread, brown rice and whole grain pasta (instead of " white bread, rice, and pasta).  Get enough calcium and vitamin D. Check the label on foods and aim for 100% of the RDA (recommended daily allowance).  Regular exams  Have a dental exam and cleaning every 6 months.  See your health care team every year to talk about:  Any changes in your health.  Any medicines your care team has prescribed.  Preventive care, family planning, and ways to prevent chronic diseases.  Shots (vaccines)   HPV shots (up to age 26), if you've never had them before.  Hepatitis B shots (up to age 59), if you've never had them before.  COVID-19 shot: Get this shot when it's due.  Flu shot: Get a flu shot every year.  Tetanus shot: Get a tetanus shot every 10 years.  Pneumococcal, hepatitis A, and RSV shots: Ask your care team if you need these based on your risk.  Shingles shot (for age 50 and up).  General health tests  Diabetes screening:  Starting at age 35, Get screened for diabetes at least every 3 years.  If you are younger than age 35, ask your care team if you should be screened for diabetes.  Cholesterol test: At age 39, start having a cholesterol test every 5 years, or more often if advised.  Bone density scan (DEXA): At age 50, ask your care team if you should have this scan for osteoporosis (brittle bones).  Hepatitis C: Get tested at least once in your life.  Abdominal aortic aneurysm screening: Talk to your doctor about having this screening if you:  Have ever smoked; and  Are biologically male; and  Are between the ages of 65 and 75.  STIs (sexually transmitted infections)  Before age 24: Ask your care team if you should be screened for STIs.  After age 24: Get screened for STIs if you're at risk. You are at risk for STIs (including HIV) if:  You are sexually active with more than one person.  You don't use condoms every time.  You or a partner was diagnosed with a sexually transmitted infection.  If you are at risk for HIV, ask about PrEP medicine to prevent HIV.  Get tested for  HIV at least once in your life, whether you are at risk for HIV or not.  Cancer screening tests  Cervical cancer screening: If you have a cervix, begin getting regular cervical cancer screening tests at age 21. Most people who have regular screenings with normal results can stop after age 65. Talk about this with your provider.  Breast cancer scan (mammogram): If you've ever had breasts, begin having regular mammograms starting at age 40. This is a scan to check for breast cancer.  Colon cancer screening: It is important to start screening for colon cancer at age 45.  Have a colonoscopy test every 10 years (or more often if you're at risk) Or, ask your provider about stool tests like a FIT test every year or Cologuard test every 3 years.  To learn more about your testing options, visit: www.[employer]/333142.pdf.  For help making a decision, visit: rupinder/vr09185.  Prostate cancer screening test: If you have a prostate and are age 55 to 69, ask your provider if you would benefit from a yearly prostate cancer screening test.  Lung cancer screening: If you are a current or former smoker age 50 to 80, ask your care team if ongoing lung cancer screenings are right for you.  For informational purposes only. Not to replace the advice of your health care provider. Copyright   2023 [facility]. All rights reserved. Clinically reviewed by the [facility]. MumumÃ­o [id number] - REV 04/24.  Substance Use Disorder: Care Instructions  Overview     You can improve your life and health by stopping your use of alcohol or drugs. When you don't drink or use drugs, you may feel and sleep better. You may get along better with your family, friends, and coworkers. There are medicines and programs that can help with substance use disorder.  How can you care for yourself at home?  Here are some ways to help you stay sober and prevent relapse.  If you have been given medicine to help keep you sober or  reduce your cravings, be sure to take it exactly as prescribed.  Talk to your doctor about programs that can help you stop using drugs or drinking alcohol.  Do not keep alcohol or drugs in your home.  Plan ahead. Think about what you'll say if other people ask you to drink or use drugs. Try not to spend time with people who drink or use drugs.  Use the time and money spent on drinking or drugs to do something that's important to you.  Preventing a relapse  Have a plan to deal with relapse. Learn to recognize changes in your thinking that lead you to drink or use drugs. Get help before you start to drink or use drugs again.  Try to stay away from situations, friends, or places that may lead you to drink or use drugs.  If you feel the need to drink alcohol or use drugs again, seek help right away. Call a trusted friend or family member. Some people get support from organizations such as Narcotics Anonymous or [employer] or from treatment facilities.  If you relapse, get help as soon as you can. Some people make a plan with another person that outlines what they want that person to do for them if they relapse. The plan usually includes how to handle the relapse and who to notify in case of relapse.  Don't give up. Remember that a relapse doesn't mean that you have failed. Use the experience to learn the triggers that lead you to drink or use drugs. Then quit again. Recovery is a lifelong process. Many people have several relapses before they are able to quit for good.  Follow-up care is a key part of your treatment and safety. Be sure to make and go to all appointments, and call your doctor if you are having problems. It's also a good idea to know your test results and keep a list of the medicines you take.  When should you call for help?   Call 911  anytime you think you may need emergency care. For example, call if you or someone else:    Has overdosed or has withdrawal signs. Be sure to tell the emergency  "workers that you are or someone else is using or trying to quit using drugs. Overdose or withdrawal signs may include:  Losing consciousness.  Seizure.  Seeing or hearing things that aren't there (hallucinations).     Is thinking or talking about suicide or harming others.   Where to get help 24 hours a day, 7 days a week   If you or someone you know talks about suicide, self-harm, a mental health crisis, a substance use crisis, or any other kind of emotional distress, get help right away. You can:    Call the Suicide and Crisis Lifeline at 458.     Call 2-564-237-TALK (1-166.918.6351).     Text HOME to 482705 to access the Crisis Text Line.   Consider saving these numbers in your phone.  Go to [employer] for more information or to chat online.  Call your doctor now or seek immediate medical care if:    You are having withdrawal symptoms. These may include nausea or vomiting, sweating, shakiness, and anxiety.   Watch closely for changes in your health, and be sure to contact your doctor if:    You have a relapse.     You need more help or support to stop.   Where can you learn more?  Go to https://www.[employer].net/patiented  Enter H573 in the search box to learn more about \"Substance Use Disorder: Care Instructions.\"  Current as of: November 15, 2023               Content Version: 14.0    9988-7125 [employer].   Care instructions adapted under license by your healthcare professional. If you have questions about a medical condition or this instruction, always ask your healthcare professional. [employer] disclaims any warranty or liability for your use of this information.      Safer Sex: Care Instructions  Overview  Safer sex is a way to reduce your risk of getting a sexually transmitted infection (STI). It can also help prevent pregnancy.  Several products can help you practice safer sex and reduce your chance of STIs. One of the best is a condom. There are internal and external " condoms. You can use a special rubber sheet (dental dam) for protection during oral sex. Disposable gloves can keep your hands from touching blood, semen, or other body fluids that can carry infections.  Remember that birth control methods such as diaphragms, IUDs, foams, and birth control pills do not stop you from getting STIs.  Follow-up care is a key part of your treatment and safety. Be sure to make and go to all appointments, and call your doctor if you are having problems. It's also a good idea to know your test results and keep a list of the medicines you take.  How can you care for yourself at home?  Think about getting vaccinated to help prevent hepatitis A, hepatitis B, and human papillomavirus (HPV). They can be spread through sex.  Use a condom every time you have sex. Use an external condom, which goes on the penis. Or use an internal condom, which goes into the vagina or anus.  Make sure you use the right size external condom. A condom that's too small can break easily. A condom that's too big can slip off during sex.  Use a new condom each time you have sex. Be careful not to poke a hole in the condom when you open the wrapper.  Don't use an internal condom and an external condom at the same time.  Never use petroleum jelly (such as Vaseline), grease, hand lotion, baby oil, or anything with oil in it. These products can make holes in the condom.  After intercourse, hold the edge of the condom as you remove it. This will help keep semen from spilling out of the condom.  Do not have sex with anyone who has symptoms of an STI, such as sores on the genitals or mouth.  Do not drink a lot of alcohol or use drugs before sex.  Limit your sex partners. Sex with one partner who has sex only with you can reduce your risk of getting an STI.  Don't share sex toys. But if you do share them, use a condom and clean the sex toys between each use.  Talk to any partners before you have sex. Talk about what you feel  "comfortable with and whether you have any boundaries with sex. And find out if your partner or partners may be at risk for any STI. Keep in mind that a person may be able to spread an STI even if they do not have symptoms. You and any partners may want to get tested for STIs.  Where can you learn more?  Go to https://www.[employer].net/patiented  Enter B608 in the search box to learn more about \"Safer Sex: Care Instructions.\"  Current as of: November 27, 2023               Content Version: 14.0    5217-4691 [employer].   Care instructions adapted under license by your healthcare professional. If you have questions about a medical condition or this instruction, always ask your healthcare professional. [employer] disclaims any warranty or liability for your use of this information.         "

## 2024-07-02 NOTE — NURSING NOTE
Prior to immunization administration, verified patients identity using patient s name and date of birth. Please see Immunization Activity for additional information.     Screening Questionnaire for Adult Immunization    Are you sick today?   No   Do you have allergies to medications, food, a vaccine component or latex?   No   Have you ever had a serious reaction after receiving a vaccination?   No   Do you have a long-term health problem with heart, lung, kidney, or metabolic disease (e.g., diabetes), asthma, a blood disorder, no spleen, complement component deficiency, a cochlear implant, or a spinal fluid leak?  Are you on long-term aspirin therapy?   Yes   Do you have cancer, leukemia, HIV/AIDS, or any other immune system problem?   No   Do you have a parent, brother, or sister with an immune system problem?   No   In the past 3 months, have you taken medications that affect  your immune system, such as prednisone, other steroids, or anticancer drugs; drugs for the treatment of rheumatoid arthritis, Crohn s disease, or psoriasis; or have you had radiation treatments?   No   Have you had a seizure, or a brain or other nervous system problem?   No   During the past year, have you received a transfusion of blood or blood    products, or been given immune (gamma) globulin or antiviral drug?   No   For women: Are you pregnant or is there a chance you could become       pregnant during the next month?   No   Have you received any vaccinations in the past 4 weeks?   No     Immunization questionnaire was positive for at least one answer.  Notified Dr. Morris.      Patient instructed to remain in clinic for 15 minutes afterwards, and to report any adverse reactions.     Screening performed by Antonia Mayes MA on 7/2/2024 at 4:16 PM.

## 2024-07-03 LAB
C TRACH DNA SPEC QL PROBE+SIG AMP: NEGATIVE
HCV AB SERPL QL IA: NONREACTIVE
HIV 1+2 AB+HIV1 P24 AG SERPL QL IA: NONREACTIVE
N GONORRHOEA DNA SPEC QL NAA+PROBE: NEGATIVE
T PALLIDUM AB SER QL: NONREACTIVE

## 2024-08-01 ENCOUNTER — OFFICE VISIT (OUTPATIENT)
Dept: FAMILY MEDICINE | Facility: CLINIC | Age: 24
End: 2024-08-01
Payer: COMMERCIAL

## 2024-08-01 VITALS
DIASTOLIC BLOOD PRESSURE: 64 MMHG | HEART RATE: 73 BPM | SYSTOLIC BLOOD PRESSURE: 118 MMHG | TEMPERATURE: 97.3 F | WEIGHT: 167.4 LBS | BODY MASS INDEX: 25.37 KG/M2 | OXYGEN SATURATION: 98 % | HEIGHT: 68 IN | RESPIRATION RATE: 20 BRPM

## 2024-08-01 DIAGNOSIS — L02.33 CARBUNCLE AND FURUNCLE OF BUTTOCK: Primary | ICD-10-CM

## 2024-08-01 DIAGNOSIS — Z11.1 SCREENING FOR TUBERCULOSIS: ICD-10-CM

## 2024-08-01 PROCEDURE — 99213 OFFICE O/P EST LOW 20 MIN: CPT | Performed by: FAMILY MEDICINE

## 2024-08-01 RX ORDER — MUPIROCIN 20 MG/G
OINTMENT TOPICAL 3 TIMES DAILY
Qty: 30 G | Refills: 0 | Status: SHIPPED | OUTPATIENT
Start: 2024-08-01 | End: 2024-08-08

## 2024-08-01 ASSESSMENT — PAIN SCALES - GENERAL: PAINLEVEL: NO PAIN (0)

## 2024-08-01 NOTE — PROGRESS NOTES
Assessment & Plan     Carbuncle and furuncle of buttock  Palpable somewhat elongated indurated but not fluctuant subcutaneous mass left side of the intergluteal fold.  Advised patient no indication for I&D today. DDx: inflamed sebaceous/EI cyst, pilonidal cyst without abscess  Advised topical abx treatment and warm compress.  Return precautions discussed and given to patient.   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  Scheduled for placement next week due to weekend coming up after today.        There are no Patient Instructions on file for this visit.    Reva Moise is a 24 year old, presenting for the following health issues:  Mass, [facility] (Schedule for Monday ), and Covid (Pt had covid, positive test July 8)        8/1/2024     6:52 AM   Additional Questions   Roomed by Antonia CHAVEZ MA   Accompanied by Self         8/1/2024     6:52 AM   Patient Reported Additional Medications   Patient reports taking the following new medications Aspirin 81mg, one tab daily     Mass    History of Present Illness       Reason for visit:  Exploring health concern    He eats 4 or more servings of fruits and vegetables daily.He consumes 1 sweetened beverage(s) daily.He exercises with enough effort to increase his heart rate 60 or more minutes per day.  He exercises with enough effort to increase his heart rate 5 days per week.   He is taking medications regularly.     Incidentally felt a lump under the skin in the intergluteal fold 3 weeks ago, size of a po.  Pain with pressure. Sitting does not elicit pain.  No drainage.  Hx of perianal abscess years ago.  Has not changed in size.  No treatment.      Review of Systems  Constitutional, neuro, ENT, endocrine, pulmonary, cardiac, gastrointestinal, genitourinary, musculoskeletal, integument and psychiatric systems are negative, except as otherwise  "noted.      Objective    /64 (BP Location: Right arm, Patient Position: Sitting, Cuff Size: Adult Regular)   Pulse 73   Temp 97.3  F (36.3  C) (Tympanic)   Resp 20   Ht 1.734 m (5' 8.25\")   Wt 75.9 kg (167 lb 6.4 oz)   SpO2 98%   BMI 25.27 kg/m    Body mass index is 25.27 kg/m .  Physical Exam   GEN: alert, oriented x 3, NAD  SKIN: good turgor; Palpable somewhat elongated, mildly indurated but not hyperemic nor  fluctuant subcutaneous mass left side of the intergluteal fold.    No results found for any visits on 08/01/24.        Signed Electronically by: Sander Morris MD    " Staff Member

## 2024-08-01 NOTE — PATIENT INSTRUCTIONS
No sign to indicate incision and drainage today.  Apply mupirocin as prescribed for 7 days.  Warm compress 10 minutes few times a day.  Avoid soaking/swimming in fresh bodies of water to reduce exposure to possible infectious organisms on the skin.  See provider promptly if the area of concern comes to a head or becomes very painful.

## 2025-06-02 ENCOUNTER — PATIENT OUTREACH (OUTPATIENT)
Dept: CARE COORDINATION | Facility: CLINIC | Age: 25
End: 2025-06-02
Payer: COMMERCIAL

## 2025-06-16 ENCOUNTER — PATIENT OUTREACH (OUTPATIENT)
Dept: CARE COORDINATION | Facility: CLINIC | Age: 25
End: 2025-06-16
Payer: COMMERCIAL

## 2025-08-01 DIAGNOSIS — F41.9 ANXIETY: ICD-10-CM

## 2025-08-02 ENCOUNTER — HEALTH MAINTENANCE LETTER (OUTPATIENT)
Age: 25
End: 2025-08-02